# Patient Record
Sex: FEMALE | Race: WHITE | Employment: FULL TIME | URBAN - METROPOLITAN AREA
[De-identification: names, ages, dates, MRNs, and addresses within clinical notes are randomized per-mention and may not be internally consistent; named-entity substitution may affect disease eponyms.]

---

## 2017-01-10 ENCOUNTER — TELEPHONE (OUTPATIENT)
Dept: INTERNAL MEDICINE CLINIC | Facility: CLINIC | Age: 52
End: 2017-01-10

## 2017-01-10 RX ORDER — MELOXICAM 15 MG/1
15 TABLET ORAL DAILY
Qty: 7 TABLET | Refills: 0 | Status: SHIPPED | OUTPATIENT
Start: 2017-01-10 | End: 2017-09-06 | Stop reason: ALTCHOICE

## 2017-01-10 NOTE — TELEPHONE ENCOUNTER
Both of her legs are both swollen and is affecting her knees. They feel heavy and she is wearing compression socks. Thinks   It can be due to the weather. She tried the hydrocodone but thinks an anti-inflammatory would help more.       [id number] [place]

## 2017-01-10 NOTE — TELEPHONE ENCOUNTER
Relayed dr Sidhu Mins understanding, rx sent, stated she's only on an diuretic daily with her BP med

## 2017-01-10 NOTE — TELEPHONE ENCOUNTER
Pt called back stated she swells daily r/t DX Lymphedema (genetic) but she thinks with the change in the weather since Sunday Bilateral swelling is more,much heavier,painful,no redness,taking muscle relaxer's,vicodin,Knees are swollen/feels tight, thinks s

## 2017-09-06 ENCOUNTER — OFFICE VISIT (OUTPATIENT)
Dept: INTERNAL MEDICINE CLINIC | Facility: CLINIC | Age: 52
End: 2017-09-06

## 2017-09-06 VITALS
SYSTOLIC BLOOD PRESSURE: 130 MMHG | HEART RATE: 87 BPM | WEIGHT: 250.63 LBS | DIASTOLIC BLOOD PRESSURE: 84 MMHG | TEMPERATURE: 98 F | HEIGHT: 67 IN | RESPIRATION RATE: 20 BRPM | OXYGEN SATURATION: 98 % | BODY MASS INDEX: 39.34 KG/M2

## 2017-09-06 DIAGNOSIS — E78.5 HYPERLIPIDEMIA, UNSPECIFIED HYPERLIPIDEMIA TYPE: ICD-10-CM

## 2017-09-06 DIAGNOSIS — E11.9 TYPE 2 DIABETES MELLITUS WITHOUT COMPLICATION, WITHOUT LONG-TERM CURRENT USE OF INSULIN (HCC): ICD-10-CM

## 2017-09-06 DIAGNOSIS — I10 ESSENTIAL HYPERTENSION: ICD-10-CM

## 2017-09-06 DIAGNOSIS — E03.9 HYPOTHYROIDISM, UNSPECIFIED TYPE: ICD-10-CM

## 2017-09-06 DIAGNOSIS — Z12.11 SCREENING FOR MALIGNANT NEOPLASM OF COLON: ICD-10-CM

## 2017-09-06 DIAGNOSIS — Z12.31 VISIT FOR SCREENING MAMMOGRAM: ICD-10-CM

## 2017-09-06 DIAGNOSIS — Z00.00 ROUTINE PHYSICAL EXAMINATION: Primary | ICD-10-CM

## 2017-09-06 DIAGNOSIS — Z78.0 MENOPAUSE: ICD-10-CM

## 2017-09-06 PROCEDURE — 99396 PREV VISIT EST AGE 40-64: CPT | Performed by: INTERNAL MEDICINE

## 2017-09-06 RX ORDER — CHOLECALCIFEROL (VITAMIN D3) 125 MCG
500 CAPSULE ORAL DAILY
COMMUNITY

## 2017-09-06 RX ORDER — LOSARTAN POTASSIUM AND HYDROCHLOROTHIAZIDE 25; 100 MG/1; MG/1
TABLET ORAL
COMMUNITY
Start: 2017-08-10

## 2017-09-06 RX ORDER — HYDROCODONE BITARTRATE AND ACETAMINOPHEN 5; 325 MG/1; MG/1
TABLET ORAL
Qty: 30 TABLET | Refills: 0 | Status: SHIPPED | OUTPATIENT
Start: 2017-09-06 | End: 2018-07-31 | Stop reason: ALTCHOICE

## 2017-09-06 NOTE — ASSESSMENT & PLAN NOTE
Stable on levothyroxine at 125 mcg once daily. Last labs as documented per endocrinology at [facility] was normal and hence refills have been provided.

## 2017-09-06 NOTE — PATIENT INSTRUCTIONS
Problem List Items Addressed This Visit        Unprioritized    Essential hypertension     Blood pressure 130/84, pulse 87, temperature 97.6 °F (36.4 °C), temperature source Oral, resp.  rate 20, height 5' 7\" (1.702 m), weight 250 lb 9.6 oz (113.7 kg), las Medications    MetFORMIN HCl 1000 MG Oral Tab    Other Relevant Orders    CBC WITH DIFFERENTIAL WITH PLATELET    COMP METABOLIC PANEL (14)    HEMOGLOBIN A1C    LIPID PANEL    MICROALB/CREAT RATIO, RANDOM URINE    URINALYSIS, ROUTINE    ASSAY, THYROID STIM

## 2017-09-06 NOTE — ASSESSMENT & PLAN NOTE
Patient has been on diet control alone and unplanned sterols. She does take pravastatin once a week as any higher doses causes leg cramps. Recheck labs per endocrinology has been reviewed. Orders for repeat labs have been provided.

## 2017-09-06 NOTE — ASSESSMENT & PLAN NOTE
Blood pressure 130/84, pulse 87, temperature 97.6 °F (36.4 °C), temperature source Oral, resp. rate 20, height 5' 7\" (1.702 m), weight 250 lb 9.6 oz (113.7 kg), last menstrual period 08/11/2017, SpO2 98 %, not currently breastfeeding.      Stable blood pre

## 2017-09-06 NOTE — PROGRESS NOTES
HPI:    Patient ID:  Physical    Pap Smear,3 Years due on 09/10/2016  Pap per Dr Alex Prieto. Adriangustavo Rosalie due on 08/13/2017    There is no immunization history on file for this patient. Tdap is due but pt declines.   Refuses flu as well as the pneumovac attending    mR Mayorga, PGY1 [id number]          Review of Systems   Constitutional: Negative. HENT: Negative. Eyes: Negative. Respiratory: Negative. Cardiovascular: Negative. Gastrointestinal: Negative. Endocrine: Negative.     Micheline Seymour Body mass index is 39.25 kg/m². PHYSICAL EXAM:   Physical Exam   Constitutional: She is oriented to person, place, and time. She appears well-developed and well-nourished.    HENT:   Right Ear: External ear normal.   Left Ear: External ear normal. oz (113.7 kg), last menstrual period 08/11/2017, SpO2 98 %, not currently breastfeeding. Stable blood pressure, continue on Hyzaar 100/25, 1 tablet once daily. Renal functions have been normal per last labs done at [place].   Will follow-up on repeat la THYROID STIM HORMONE    VITAMIN B12    VITAMIN D, 25-HYDROXY      Other Visit Diagnoses     Menopause        Relevant Orders    XR DEXA BONE DENSITOMETRY (CPT=77080)    Visit for screening mammogram        Relevant Orders    [place] SCREENING BILAT (CPT=77067)

## 2017-09-06 NOTE — ASSESSMENT & PLAN NOTE
Currently on glimepiride at 2 mg once daily, metformin 1000 mg 2 times daily, Orvel Damion. Given current issues with [employer] have discussed need to discontinue medication and discuss alternate treatments with endocrinology at [facility].   Eye exam di

## 2017-09-06 NOTE — ASSESSMENT & PLAN NOTE
Normal exam.  Labs as ordered. Skin check normal.  No significant abnormal nevi. Skin check usually completed per Dr. Monica Rocha. Breast exam completed–no palpable abnormalities, discharge from the nipples or axillary adenopathy.   No cervical or inguinal lym

## 2017-10-27 ENCOUNTER — OFFICE VISIT (OUTPATIENT)
Dept: OBGYN CLINIC | Facility: CLINIC | Age: 52
End: 2017-10-27

## 2017-10-27 VITALS
SYSTOLIC BLOOD PRESSURE: 142 MMHG | BODY MASS INDEX: 37.87 KG/M2 | DIASTOLIC BLOOD PRESSURE: 84 MMHG | WEIGHT: 247 LBS | HEART RATE: 91 BPM | HEIGHT: 67.75 IN

## 2017-10-27 DIAGNOSIS — Z12.31 VISIT FOR SCREENING MAMMOGRAM: ICD-10-CM

## 2017-10-27 DIAGNOSIS — Z01.419 ENCOUNTER FOR GYNECOLOGICAL EXAMINATION WITHOUT ABNORMAL FINDING: Primary | ICD-10-CM

## 2017-10-27 PROCEDURE — 99396 PREV VISIT EST AGE 40-64: CPT | Performed by: OBSTETRICS & GYNECOLOGY

## 2017-10-30 NOTE — PROGRESS NOTES
Leigha Hernandez is a 47year old female [facility] Patient's last menstrual period was 08/14/2017. Patient presents with:  Gyn Exam: Annual and Mammo order. Doing well. Not on ocps. Periods skipping every 1-3 months -- normal in heaviness. No hot flashes. Dony Rahman Grandmother      CAD   • Breast Cancer Other      family h/o, all four pA   • Glaucoma Neg        MEDICATIONS:    Current Outpatient Prescriptions:   •  MetFORMIN HCl 1000 MG Oral Tab, TAKE ONE TABLET BY MOUTH TWO TIMES A DAY WITH MEALS, Disp: , Rfl:   • any breast pain, lumps, or discharge. Neurological:  denies headaches, extremity weakness or numbness. Psychiatric: denies depression or anxiety. Endocrine:   denies excessive thirst or urination.   Heme/Lymph:  denies history of anemia, easy bruising o

## 2018-04-25 ENCOUNTER — OFFICE VISIT (OUTPATIENT)
Dept: INTERNAL MEDICINE CLINIC | Facility: CLINIC | Age: 53
End: 2018-04-25

## 2018-04-25 ENCOUNTER — LAB ENCOUNTER (OUTPATIENT)
Dept: LAB | Facility: HOSPITAL | Age: 53
End: 2018-04-25
Attending: INTERNAL MEDICINE
Payer: COMMERCIAL

## 2018-04-25 ENCOUNTER — HOSPITAL ENCOUNTER (OUTPATIENT)
Dept: CT IMAGING | Facility: HOSPITAL | Age: 53
Discharge: HOME | End: 2018-04-25
Attending: INTERNAL MEDICINE
Payer: COMMERCIAL

## 2018-04-25 ENCOUNTER — NURSE TRIAGE (OUTPATIENT)
Dept: INTERNAL MEDICINE CLINIC | Facility: CLINIC | Age: 53
End: 2018-04-25

## 2018-04-25 VITALS
DIASTOLIC BLOOD PRESSURE: 84 MMHG | RESPIRATION RATE: 18 BRPM | BODY MASS INDEX: 37.83 KG/M2 | TEMPERATURE: 98 F | HEART RATE: 83 BPM | SYSTOLIC BLOOD PRESSURE: 128 MMHG | WEIGHT: 241 LBS | HEIGHT: 67 IN

## 2018-04-25 DIAGNOSIS — R10.33 PERIUMBILICAL ABDOMINAL PAIN: ICD-10-CM

## 2018-04-25 DIAGNOSIS — R10.33 PERIUMBILICAL ABDOMINAL PAIN: Primary | ICD-10-CM

## 2018-04-25 PROCEDURE — 82150 ASSAY OF AMYLASE: CPT

## 2018-04-25 PROCEDURE — 99214 OFFICE O/P EST MOD 30 MIN: CPT | Performed by: INTERNAL MEDICINE

## 2018-04-25 PROCEDURE — 85025 COMPLETE CBC W/AUTO DIFF WBC: CPT

## 2018-04-25 PROCEDURE — 74177 CT ABD & PELVIS W/CONTRAST: CPT | Performed by: INTERNAL MEDICINE

## 2018-04-25 PROCEDURE — 99212 OFFICE O/P EST SF 10 MIN: CPT | Performed by: INTERNAL MEDICINE

## 2018-04-25 PROCEDURE — 82565 ASSAY OF CREATININE: CPT

## 2018-04-25 PROCEDURE — 80053 COMPREHEN METABOLIC PANEL: CPT

## 2018-04-25 PROCEDURE — 83690 ASSAY OF LIPASE: CPT

## 2018-04-25 PROCEDURE — 36415 COLL VENOUS BLD VENIPUNCTURE: CPT

## 2018-04-25 RX ORDER — DULAGLUTIDE 0.75 MG/.5ML
INJECTION, SOLUTION SUBCUTANEOUS
Refills: 10 | COMMUNITY
Start: 2018-04-04

## 2018-04-25 RX ORDER — CIPROFLOXACIN 500 MG/1
500 TABLET, FILM COATED ORAL 2 TIMES DAILY
Qty: 14 TABLET | Refills: 0 | Status: SHIPPED | OUTPATIENT
Start: 2018-04-25 | End: 2018-05-02

## 2018-04-25 NOTE — TELEPHONE ENCOUNTER
Action Requested: Summary for Provider     []  Critical Lab, Recommendations Needed  [x] Need Additional Advice  []   FYI    []   Need Orders  [] Need Medications Sent to Pharmacy  []  Other     SUMMARY: Pt called states that she has been having intermitte

## 2018-04-25 NOTE — TELEPHONE ENCOUNTER
Reason for Disposition  • MILD pain that comes and goes (cramps) lasts > 24 hours    Protocols used: ABDOMINAL PAIN - UPPER-A-OH

## 2018-04-25 NOTE — ASSESSMENT & PLAN NOTE
Intermittent episodes of abdominal bloating, pain usually in the epigastric and periumbilical region. Crampy severe pain. Recently with chills, nausea. Has been having intermittent episodes of diarrhea with constipation as a baseline.   Hyperactive bowel

## 2018-04-25 NOTE — PROGRESS NOTES
HPI:    Patient ID: Leigha Hernandez is a 47year old female. Abdominal Pain   This is a recurrent problem. The current episode started more than 1 month ago. The problem occurs intermittently.  Progression since onset: Episodic exacerbations of abdominal 2000 UNITS Oral Cap Take 2 capsules by mouth daily.  Take 5,000 units daily  Disp:  Rfl:    Levothyroxine Sodium (SYNTHROID, LEVOTHROID) 125 MCG Oral Tab Take 125 mcg by mouth before breakfast. Take 1/2 tablet by mouth daily  Disp:  Rfl:    glimepiride (AMA area. There is no rebound and no CVA tenderness. No hernia. History of umbilical hernia repair in childhood. Lymphadenopathy:     She has no cervical adenopathy. Neurological: She is alert and oriented to person, place, and time.  She has normal refle

## 2018-04-26 RX ORDER — CIPROFLOXACIN 500 MG/1
500 TABLET, FILM COATED ORAL 2 TIMES DAILY
Qty: 14 TABLET | Refills: 0 | Status: SHIPPED | OUTPATIENT
Start: 2018-04-26 | End: 2018-07-31 | Stop reason: ALTCHOICE

## 2018-10-26 ENCOUNTER — HOSPITAL ENCOUNTER (OUTPATIENT)
Dept: MAMMOGRAPHY | Facility: HOSPITAL | Age: 53
Discharge: HOME | End: 2018-10-26
Attending: OBSTETRICS & GYNECOLOGY
Payer: COMMERCIAL

## 2018-10-26 DIAGNOSIS — Z12.31 VISIT FOR SCREENING MAMMOGRAM: ICD-10-CM

## 2018-10-26 PROCEDURE — 77063 BREAST TOMOSYNTHESIS BI: CPT | Performed by: OBSTETRICS & GYNECOLOGY

## 2018-10-26 PROCEDURE — 77067 SCR MAMMO BI INCL CAD: CPT | Performed by: OBSTETRICS & GYNECOLOGY

## 2018-11-05 NOTE — PROGRESS NOTES
Normal mammogram.  Next in one year. Encouraged to do monthly self breast exams.  Letter Sent via [employer]

## 2018-11-15 ENCOUNTER — APPOINTMENT (OUTPATIENT)
Dept: LAB | Facility: HOSPITAL | Age: 53
End: 2018-11-15
Attending: OBSTETRICS & GYNECOLOGY
Payer: COMMERCIAL

## 2018-11-15 ENCOUNTER — OFFICE VISIT (OUTPATIENT)
Dept: OBGYN CLINIC | Facility: CLINIC | Age: 53
End: 2018-11-15
Payer: COMMERCIAL

## 2018-11-15 VITALS — WEIGHT: 252 LBS | HEIGHT: 67 IN | BODY MASS INDEX: 39.55 KG/M2

## 2018-11-15 DIAGNOSIS — R68.82 DECREASED LIBIDO: ICD-10-CM

## 2018-11-15 DIAGNOSIS — N94.10 DYSPAREUNIA, FEMALE: ICD-10-CM

## 2018-11-15 DIAGNOSIS — Z12.4 SCREENING FOR MALIGNANT NEOPLASM OF CERVIX: ICD-10-CM

## 2018-11-15 DIAGNOSIS — Z01.411 ENCOUNTER FOR GYNECOLOGICAL EXAMINATION WITH ABNORMAL FINDING: Primary | ICD-10-CM

## 2018-11-15 DIAGNOSIS — N91.2 NO PERIODS: ICD-10-CM

## 2018-11-15 PROCEDURE — 99396 PREV VISIT EST AGE 40-64: CPT | Performed by: OBSTETRICS & GYNECOLOGY

## 2018-11-15 PROCEDURE — 36415 COLL VENOUS BLD VENIPUNCTURE: CPT

## 2018-11-15 PROCEDURE — 99212 OFFICE O/P EST SF 10 MIN: CPT | Performed by: OBSTETRICS & GYNECOLOGY

## 2018-11-15 PROCEDURE — 84703 CHORIONIC GONADOTROPIN ASSAY: CPT

## 2018-11-15 RX ORDER — GLIMEPIRIDE 4 MG/1
4 TABLET ORAL
COMMUNITY
Start: 2018-04-04

## 2018-11-15 RX ORDER — LEVOTHYROXINE SODIUM 0.12 MG/1
125 TABLET ORAL
COMMUNITY
Start: 2018-04-04 | End: 2019-10-18

## 2018-11-15 RX ORDER — CHOLECALCIFEROL (VITAMIN D3) 50 MCG
2 TABLET ORAL
COMMUNITY
End: 2019-10-18

## 2018-11-15 RX ORDER — MEDROXYPROGESTERONE ACETATE 10 MG/1
10 TABLET ORAL DAILY
Qty: 5 TABLET | Refills: 0 | Status: SHIPPED | OUTPATIENT
Start: 2018-11-15 | End: 2019-10-18

## 2018-11-15 RX ORDER — ASCORBIC ACID
500 CRYSTALS ORAL
COMMUNITY

## 2018-11-15 RX ORDER — LEVOTHYROXINE SODIUM 100 %
POWDER (GRAM) MISCELLANEOUS
COMMUNITY
End: 2019-10-18

## 2018-11-16 ENCOUNTER — TELEPHONE (OUTPATIENT)
Dept: OBGYN CLINIC | Facility: CLINIC | Age: 53
End: 2018-11-16

## 2018-11-16 NOTE — TELEPHONE ENCOUNTER
Pt is calling for results of pregnancy test.  Pt was advised the test was Negative. Pt verbalized understanding.

## 2018-11-20 NOTE — PROGRESS NOTES
Noelle Christianson is a 47year old female [facility] Patient's last menstrual period was 04/15/2018. Patient presents with:  Gyn Exam: annual  Other: decreased libido; increased pain. Had no issues then abruptly started.  7 months ago. No hot flashes. Substance and Sexual Activity      Alcohol use:  Yes        Alcohol/week: 0.0 oz        Comment: 1 drink/month      Drug use: No      Sexual activity: Yes        Comment:     Other Topics      Concerns:         Service: Not Asked        Blood DAY WITH MEALS, Disp: , Rfl:   •  Losartan Potassium-HCTZ 100-25 MG Oral Tab, TAKE ONE TABLET BY MOUTH EVERY DAY, Disp: , Rfl:   •  COENZYME Q10 OR, Take by mouth., Disp: , Rfl:   •  Vitamin B-12 500 MCG Oral Tab, Take 500 mcg by mouth daily. , Disp: , Rfl: adenopathy  Lymphatic: no abnormal supraclavicular or axillary adenopathy is noted  Breast:   normal without palpable masses, tenderness, asymmetry, nipple discharge, nipple retraction or skin changes  Abdomen:   soft, nontender, nondistended, no masses  S year.  Check u/s to r/o anatomical causes of deep pain. Needs to use K-Y lubricant more freely. Consider vaginal estrogen or PT -- declines. For now check hcg & if neg, provera 10 mg daily x 5d. Reviewed pathphys of decreased libido in perimenopause.

## 2018-11-28 ENCOUNTER — HOSPITAL ENCOUNTER (OUTPATIENT)
Dept: ULTRASOUND IMAGING | Facility: HOSPITAL | Age: 53
Discharge: HOME | End: 2018-11-28
Attending: OBSTETRICS & GYNECOLOGY
Payer: COMMERCIAL

## 2018-11-28 DIAGNOSIS — N94.10 DYSPAREUNIA, FEMALE: ICD-10-CM

## 2018-11-28 PROCEDURE — 76830 TRANSVAGINAL US NON-OB: CPT | Performed by: OBSTETRICS & GYNECOLOGY

## 2018-11-28 PROCEDURE — 76856 US EXAM PELVIC COMPLETE: CPT | Performed by: OBSTETRICS & GYNECOLOGY

## 2018-12-12 ENCOUNTER — TELEPHONE (OUTPATIENT)
Dept: OBGYN CLINIC | Facility: CLINIC | Age: 53
End: 2018-12-12

## 2018-12-12 NOTE — TELEPHONE ENCOUNTER
----- Message from Sowmya Fuller MD sent at 12/1/2018  6:47 PM CST -----  Pt perimenopausal thus lining normal. Neg u/s

## 2019-07-15 ENCOUNTER — TELEPHONE (OUTPATIENT)
Dept: OTHER | Age: 54
End: 2019-07-15

## 2019-07-15 NOTE — TELEPHONE ENCOUNTER
Pt having ongoing symptoms of yeast infection. Itching and white discharge. OTC monistat x 3 days not helping. Requesting rx for diflucan with refills. MARY please advise.

## 2019-07-16 RX ORDER — FLUCONAZOLE 150 MG/1
150 TABLET ORAL ONCE
Qty: 2 TABLET | Refills: 0 | Status: SHIPPED | OUTPATIENT
Start: 2019-07-16 | End: 2019-07-16

## 2019-07-16 NOTE — TELEPHONE ENCOUNTER
Diflucan 150 mgs 1 tab po q weekly x 2 doses-2 tabs  Has not been seen since 4/2018-needs a visit in the next 4-5 weeks

## 2019-10-08 ENCOUNTER — TELEPHONE (OUTPATIENT)
Dept: INTERNAL MEDICINE CLINIC | Facility: CLINIC | Age: 54
End: 2019-10-08

## 2019-10-08 ENCOUNTER — NURSE TRIAGE (OUTPATIENT)
Dept: INTERNAL MEDICINE CLINIC | Facility: CLINIC | Age: 54
End: 2019-10-08

## 2019-10-08 NOTE — TELEPHONE ENCOUNTER
Advised patient of Dr. Natalia Negro note. Patient verbalized understanding  Patient stated she does not feel like she needs to go to the ER and is requesting to be scheduled on 10/18/19 at 2:30 p.m. TCM slot.      Patient stated, \" I still want the appointment

## 2019-10-08 NOTE — TELEPHONE ENCOUNTER
Called Pt regarding appointment 10/18 with Dr. Sharyle Mealing. Pt understands if feeling worse before this appointment to go to ER; verbalizes understanding.

## 2019-10-08 NOTE — TELEPHONE ENCOUNTER
Not appropriate to wait 10 days -would adv er eval and a possible ctas no hx of vertigo in the past -especiallywith neck soreness

## 2019-10-08 NOTE — TELEPHONE ENCOUNTER
Action Requested: Summary for Provider     []  Critical Lab, Recommendations Needed  [] Need Additional Advice  []   FYI    []   Need Orders  [] Need Medications Sent to Pharmacy  []  Other     SUMMARY: Dr Diego Melissa, patient only wants to see you, when to add

## 2019-10-08 NOTE — TELEPHONE ENCOUNTER
Patient called in stating that about 4 years ago she had neck surgery and recently she has been experiencing pain with intermittent dizziness. She is requesting an appointment with Dr. Sunny Romero. CSS transferred to triage/decision tree denies appointment.

## 2019-10-18 ENCOUNTER — APPOINTMENT (OUTPATIENT)
Dept: CT IMAGING | Facility: HOSPITAL | Age: 54
End: 2019-10-18
Attending: EMERGENCY MEDICINE
Payer: COMMERCIAL

## 2019-10-18 ENCOUNTER — APPOINTMENT (OUTPATIENT)
Dept: MRI IMAGING | Facility: HOSPITAL | Age: 54
End: 2019-10-18
Attending: EMERGENCY MEDICINE
Payer: COMMERCIAL

## 2019-10-18 ENCOUNTER — OFFICE VISIT (OUTPATIENT)
Dept: INTERNAL MEDICINE CLINIC | Facility: CLINIC | Age: 54
End: 2019-10-18
Payer: COMMERCIAL

## 2019-10-18 ENCOUNTER — HOSPITAL ENCOUNTER (EMERGENCY)
Facility: HOSPITAL | Age: 54
Discharge: HOME | End: 2019-10-18
Attending: EMERGENCY MEDICINE
Payer: COMMERCIAL

## 2019-10-18 VITALS
RESPIRATION RATE: 16 BRPM | SYSTOLIC BLOOD PRESSURE: 124 MMHG | DIASTOLIC BLOOD PRESSURE: 84 MMHG | WEIGHT: 255 LBS | HEART RATE: 96 BPM | HEIGHT: 67 IN | BODY MASS INDEX: 40.02 KG/M2

## 2019-10-18 VITALS
OXYGEN SATURATION: 98 % | RESPIRATION RATE: 16 BRPM | HEART RATE: 86 BPM | SYSTOLIC BLOOD PRESSURE: 141 MMHG | TEMPERATURE: 98 F | DIASTOLIC BLOOD PRESSURE: 73 MMHG

## 2019-10-18 DIAGNOSIS — M54.2 NECK PAIN: Primary | ICD-10-CM

## 2019-10-18 PROCEDURE — 70551 MRI BRAIN STEM W/O DYE: CPT | Performed by: EMERGENCY MEDICINE

## 2019-10-18 PROCEDURE — 83735 ASSAY OF MAGNESIUM: CPT | Performed by: EMERGENCY MEDICINE

## 2019-10-18 PROCEDURE — 80048 BASIC METABOLIC PNL TOTAL CA: CPT | Performed by: EMERGENCY MEDICINE

## 2019-10-18 PROCEDURE — 99213 OFFICE O/P EST LOW 20 MIN: CPT | Performed by: INTERNAL MEDICINE

## 2019-10-18 PROCEDURE — 99284 EMERGENCY DEPT VISIT MOD MDM: CPT

## 2019-10-18 PROCEDURE — 70498 CT ANGIOGRAPHY NECK: CPT | Performed by: EMERGENCY MEDICINE

## 2019-10-18 PROCEDURE — 85025 COMPLETE CBC W/AUTO DIFF WBC: CPT | Performed by: EMERGENCY MEDICINE

## 2019-10-18 PROCEDURE — 96374 THER/PROPH/DIAG INJ IV PUSH: CPT

## 2019-10-18 RX ORDER — MECLIZINE HYDROCHLORIDE 25 MG/1
25 TABLET ORAL 3 TIMES DAILY PRN
Qty: 20 TABLET | Refills: 0 | Status: SHIPPED | OUTPATIENT
Start: 2019-10-18 | End: 2021-08-18

## 2019-10-18 RX ORDER — CANAGLIFLOZIN 300 MG/1
TABLET, FILM COATED ORAL
Refills: 2 | COMMUNITY
Start: 2019-09-29 | End: 2021-08-18

## 2019-10-18 RX ORDER — NAPROXEN 500 MG/1
500 TABLET ORAL 2 TIMES DAILY PRN
Qty: 20 TABLET | Refills: 0 | Status: SHIPPED | OUTPATIENT
Start: 2019-10-18 | End: 2019-10-25

## 2019-10-18 RX ORDER — LORAZEPAM 1 MG/1
1 TABLET ORAL ONCE
Status: DISCONTINUED | OUTPATIENT
Start: 2019-10-18 | End: 2019-10-18

## 2019-10-18 RX ORDER — GABAPENTIN 100 MG/1
100 CAPSULE ORAL 2 TIMES DAILY
Qty: 28 CAPSULE | Refills: 0 | Status: SHIPPED | OUTPATIENT
Start: 2019-10-18 | End: 2019-11-01

## 2019-10-18 RX ORDER — MECLIZINE HCL 12.5 MG/1
TABLET ORAL
Qty: 30 TABLET | Refills: 2 | Status: SHIPPED | OUTPATIENT
Start: 2019-10-18 | End: 2021-08-18

## 2019-10-18 RX ORDER — LORAZEPAM 2 MG/ML
1 INJECTION INTRAMUSCULAR ONCE
Status: COMPLETED | OUTPATIENT
Start: 2019-10-18 | End: 2019-10-18

## 2019-10-18 NOTE — PATIENT INSTRUCTIONS
Problem List Items Addressed This Visit        Unprioritized    Neck pain - Primary     Sudden onset of severe pain at the nape of the neck with sensation of heat followed by lightheadedness, neck discomfort and pain radiating down into the arms.   She has

## 2019-10-18 NOTE — ASSESSMENT & PLAN NOTE
Sudden onset of severe pain at the nape of the neck with sensation of heat followed by lightheadedness, neck discomfort and pain radiating down into the arms. She has had a sensation of lightheadedness, dizziness and gait instability.   She has been trying

## 2019-10-18 NOTE — ED NOTES
Patient refusing blood work at this time. States \"Dr. My Correa spoke to Dr. Aneesh Jurado and I'm here for an MRI. \"

## 2019-10-18 NOTE — ED INITIAL ASSESSMENT (HPI)
Patient sent here for her primary doctors office for a month of dizziness and neck pain.  Per patient, doctor spoke with Dr Jackie Mckeon to come here for an MRI

## 2019-10-18 NOTE — PROGRESS NOTES
HPI:    Patient ID: Do Schulz is a 49year old female. Sudden severe pain at the base of the neck which happened about 4 to 5 days back followed by a sensation of heat around the neck and pain radiating into the arms.   She has had intermittent lig Subcutaneous Solution Pen-injector, , Disp: , Rfl: 10  MetFORMIN HCl 1000 MG Oral Tab, TAKE ONE TABLET BY MOUTH TWO TIMES A DAY WITH MEALS, Disp: , Rfl:   Losartan Potassium-HCTZ 100-25 MG Oral Tab, TAKE ONE TABLET BY MOUTH EVERY DAY, Disp: , Rfl:   Rosa Maxwell Normal range of motion. General: No tenderness or edema. Lymphadenopathy:     She has no cervical adenopathy. Neurological: She is alert and oriented to person, place, and time. She has normal reflexes. She displays normal reflexes.  No crania

## 2019-10-19 NOTE — ED PROVIDER NOTES
Patient Seen in: [facility] AND [facility] Emergency Department      History   Patient presents with:  Dizziness (neurologic)    Stated Complaint: Dizziness    HPI    56-year-old female presents for evaluation of neck pain and dizziness.   Patient reports for the is not in acute distress. Appearance: She is well-developed. HENT:      Head: Normocephalic and atraumatic. Eyes:      Conjunctiva/sclera: Conjunctivae normal.   Neck:      Musculoskeletal: Normal range of motion and neck supple. No neck rigidity. ------                     CBC W/ DIFFERENTIAL[[id number]]          Abnormal            Final result                 Please view results for these tests on the individual orders.    RAINBOW DRAW BLUE   RAINBOW DRAW LAVENDER   RAINBOW DRAW LIGHT GREEN non-ionic     intravenous contrast material. Multi-planar reformatted/3-D images were     created to optimize visualization of vascular anatomy. Automated exposure     control for dose reduction was used.      Adjustment of the mA and/or kV was done based (CST): Airam Larson MD on 10/18/2019 at 20:22                 ED Medications Administered:   Medications   LORazepam (ATIVAN) injection 1 mg (1 mg Intravenous Given 10/18/19 1949)   iopamidol (ISOVUE-M) 76 % injection 100 mL (70 mL Intravenous G associated acute management issues with the Patient and  at the bedside, and I explained the need for further follow-up evaluation and treatment.       Condition upon disposition: Stable          Disposition and Plan     Clinical Impression:  Neck pa

## 2020-08-24 ENCOUNTER — PATIENT OUTREACH (OUTPATIENT)
Dept: INTERNAL MEDICINE CLINIC | Facility: CLINIC | Age: 55
End: 2020-08-24

## 2020-08-24 NOTE — TELEPHONE ENCOUNTER
Patient is seen by endocrinology at [facility]. She did have some insurance restrictions. Please check if she plans to follow-up with us. I will place the order if so.   She will also need to set up an appointment

## 2020-08-24 NOTE — PROGRESS NOTES
Left message for patient to call office back, patient due for [employer] [place] last current order exp. 9/6/18. Dr foster Flores pending in EMR.

## 2020-10-02 ENCOUNTER — HOSPITAL ENCOUNTER (OUTPATIENT)
Dept: MAMMOGRAPHY | Age: 55
Discharge: HOME | End: 2020-10-02
Attending: INTERNAL MEDICINE
Payer: COMMERCIAL

## 2020-10-02 DIAGNOSIS — Z12.31 ENCOUNTER FOR SCREENING MAMMOGRAM FOR BREAST CANCER: ICD-10-CM

## 2020-10-02 PROCEDURE — 77063 BREAST TOMOSYNTHESIS BI: CPT | Performed by: INTERNAL MEDICINE

## 2020-10-02 PROCEDURE — 77067 SCR MAMMO BI INCL CAD: CPT | Performed by: INTERNAL MEDICINE

## 2020-11-09 ENCOUNTER — PATIENT MESSAGE (OUTPATIENT)
Dept: INTERNAL MEDICINE CLINIC | Facility: CLINIC | Age: 55
End: 2020-11-09

## 2020-11-09 ENCOUNTER — NURSE TRIAGE (OUTPATIENT)
Dept: INTERNAL MEDICINE CLINIC | Facility: CLINIC | Age: 55
End: 2020-11-09

## 2020-11-09 NOTE — TELEPHONE ENCOUNTER
Action Requested: Summary for Provider     []  Critical Lab, Recommendations Needed  [] Need Additional Advice  []   FYI    []   Need Orders  [] Need Medications Sent to Pharmacy  []  Other     SUMMARY:    Patient thinks she got Covid from a new co-worker.

## 2020-11-09 NOTE — TELEPHONE ENCOUNTER
See acute telephone encounter 11/9/20. From: Diego Pizano  To:  Milly Gibson MD  Sent: 11/9/2020  6:25 AM CST  Subject: Other    Good morning Dr,    I need a script for a Covid test.  I am running a fecer of 102, chills, bodies aches, headaches and

## 2020-11-09 NOTE — TELEPHONE ENCOUNTER
Keshav message sent. Triage RN=call and triage. ----- Message from Becky Santos.  Silva Gavin sent at 11/9/2020  6:25 AM CST -----  Regarding: Other  Contact: [phone]  Good morning ,    I need a script for a Covid test.  I am running a fecer of 102, chil

## 2020-11-10 ENCOUNTER — TELEMEDICINE (OUTPATIENT)
Dept: INTERNAL MEDICINE CLINIC | Facility: CLINIC | Age: 55
End: 2020-11-10
Payer: COMMERCIAL

## 2020-11-10 DIAGNOSIS — B34.9 ACUTE VIRAL SYNDROME: Primary | ICD-10-CM

## 2020-11-10 DIAGNOSIS — J01.00 ACUTE NON-RECURRENT MAXILLARY SINUSITIS: ICD-10-CM

## 2020-11-10 PROBLEM — J32.0 MAXILLARY SINUSITIS: Status: ACTIVE | Noted: 2020-11-10

## 2020-11-10 PROCEDURE — 99214 OFFICE O/P EST MOD 30 MIN: CPT | Performed by: NURSE PRACTITIONER

## 2020-11-10 RX ORDER — AZITHROMYCIN 250 MG/1
TABLET, FILM COATED ORAL
Qty: 6 TABLET | Refills: 0 | Status: SHIPPED | OUTPATIENT
Start: 2020-11-10 | End: 2020-11-15

## 2020-11-10 NOTE — ASSESSMENT & PLAN NOTE
A/P 67-year-old female who is diabetic and last week developed postnasal drip. She also had a metallic taste in her mouth. She had heightened sense of smell and taste. She denies any body aches. She did have a fever that went up as high as 101.9.   She

## 2020-11-10 NOTE — PROGRESS NOTES
HPI:    Patient ID: Rosa Lim is a 48year old female. Please note that the following visit was completed using two-way, real-time interactive audio and/or video communication.   This has been done in good joy to provide continuity of care in the b stress of starting a new job she felt she was run down. She does meet some criteria for Covid testing such as fever, cough, chills, and feeling weak. Mo will test for Covid also. St likely maxillary sinus infection.   I visually examined the back of her Positive for cough. Negative for shortness of breath. Cardiovascular: Negative for chest pain, palpitations and leg swelling. Gastrointestinal: Negative for vomiting, abdominal pain, diarrhea and constipation.    Endocrine: Negative for cold intoleranc Allergy Text Annotation: amoxicillin  Penicillins                 Comment:Other reaction(s): PENICILLINS   PHYSICAL EXAM:   Physical Exam    Constitutional: She is oriented to person, place, and time. She appears well-developed and well-nourished.    Pulmon hours. Do not exceed 4 grams in a 24 hour period.              Relevant Medications    azithromycin (ZITHROMAX Z-NIK) 250 MG Oral Tab      Other Visit Diagnoses     Acute viral syndrome    -  Primary    Relevant Orders    SARS-COV-2 BY PCR ()

## 2020-11-11 ENCOUNTER — APPOINTMENT (OUTPATIENT)
Dept: LAB | Age: 55
End: 2020-11-11
Attending: NURSE PRACTITIONER
Payer: COMMERCIAL

## 2020-11-11 DIAGNOSIS — B34.9 ACUTE VIRAL SYNDROME: ICD-10-CM

## 2020-11-18 ENCOUNTER — TELEPHONE (OUTPATIENT)
Dept: INTERNAL MEDICINE CLINIC | Facility: CLINIC | Age: 55
End: 2020-11-18

## 2020-11-18 ENCOUNTER — TELEMEDICINE (OUTPATIENT)
Dept: INTERNAL MEDICINE CLINIC | Facility: CLINIC | Age: 55
End: 2020-11-18
Payer: COMMERCIAL

## 2020-11-18 DIAGNOSIS — U07.1 COVID-19: Primary | ICD-10-CM

## 2020-11-18 PROCEDURE — 99213 OFFICE O/P EST LOW 20 MIN: CPT | Performed by: NURSE PRACTITIONER

## 2020-11-18 NOTE — TELEPHONE ENCOUNTER
Virtual appointment made for today 11/18/20      Denies fever, chills, sweats, cough, pain, soreness, loss of taste/smell, nausea, vomiting, diarrhea symptoms, change in appetite, sore throat, headache.     Last used a fever reducing medication since Mon

## 2020-11-18 NOTE — ASSESSMENT & PLAN NOTE
A/P-48year-old female who recently started a new job and developed flulike symptoms several days after starting an in office. A coworker who went to a [employer] party spread Covid throughout the office. Patient had fever, diarrhea and nausea.   She said

## 2020-11-18 NOTE — PROGRESS NOTES
HPI:    Patient ID: Samir Patel is a 48year old female. Please note that the following visit was completed using two-way, real-time interactive audio and video communication.   This has been done in good joy to provide continuity of care in the best and social distance. 3) Letter sent to my chart stating she can return to work on November 30 if Covid test negative.         Past Medical History:   Diagnosis Date   • Diabetes ([address] 2002   • High cholesterol    • Hypertension 2005   • Hypothyroidism 2005 Psychiatric/Behavioral: The patient is not nervous/anxious.              Current Outpatient Medications   Medication Sig Dispense Refill   • INVOKANA 300 MG Oral Tab   2   • Meclizine HCl 25 MG Oral Tab Take 1 tablet (25 mg total) by mouth 3 (three) times BMI.(2)           ASSESSMENT/PLAN:     Problem List Items Addressed This Visit        Unprioritized    COVID-19 - Primary     A/P-48year-old female who recently started a new job and developed flulike symptoms several days after starting an in office.   A

## 2020-11-23 ENCOUNTER — APPOINTMENT (OUTPATIENT)
Dept: LAB | Age: 55
End: 2020-11-23
Attending: NURSE PRACTITIONER
Payer: COMMERCIAL

## 2020-11-23 DIAGNOSIS — U07.1 COVID-19: ICD-10-CM

## 2021-02-22 ENCOUNTER — PATIENT MESSAGE (OUTPATIENT)
Dept: INTERNAL MEDICINE CLINIC | Facility: CLINIC | Age: 56
End: 2021-02-22

## 2021-02-22 NOTE — TELEPHONE ENCOUNTER
From: Alphonso Alas  To: Adolfo Zambrano MD  Sent: 2/22/2021 10:18 AM CST  Subject: Other    I tested positive for covid on November 9th and negative on December 10th. I'm wanting to get the vaccine and will be eligible this Thursday.  However, should I get

## 2021-08-02 ENCOUNTER — OFFICE VISIT (OUTPATIENT)
Dept: OPTOMETRY | Facility: CLINIC | Age: 56
End: 2021-08-02
Payer: COMMERCIAL

## 2021-08-02 DIAGNOSIS — H52.4 PRESBYOPIA: ICD-10-CM

## 2021-08-02 DIAGNOSIS — E11.9 TYPE 2 DIABETES MELLITUS WITHOUT COMPLICATION, WITHOUT LONG-TERM CURRENT USE OF INSULIN (HCC): Primary | ICD-10-CM

## 2021-08-02 PROCEDURE — 92004 COMPRE OPH EXAM NEW PT 1/>: CPT | Performed by: OPTOMETRIST

## 2021-08-02 NOTE — PROGRESS NOTES
Mora Orr is a 55year old female.     HPI:     HPI     Diabetic Eye Exam     Diabetes Type: Type 2, controlled with diet and taking oral medications    Duration: 17 years    Number of years diabetic: 17    Number of years on pills: 17    Number of ye B 12) 250 MCG Oral Lozenge Take 500 mcg by mouth. • Dulaglutide 1.5 MG/0.5ML Subcutaneous Solution Pen-injector Inject 1.5 mg weekly     • glimepiride 4 MG Oral Tab Take 4 mg by mouth.      • TRULICITY 6.46 MN/2.1WW Subcutaneous Solution Pen-injector Full          Extraocular Movement       Right Left     Full, Ortho Full, Ortho          Neuro/Psych     Oriented x3: Yes    Mood/Affect: Normal          Dilation     Both eyes: 1.0% Mydriacyl and 2.5% Mk Synephrine @ 4:05 PM            Additional Tests

## 2021-08-02 NOTE — PATIENT INSTRUCTIONS
Type II diabetes mellitus ([facility]  I advised patient that there is no background diabetic retinopathy in either eye and that they should continue to keep their blood sugar under control and continue to see their physician as directed.  I stressed the importan

## 2021-08-18 ENCOUNTER — OFFICE VISIT (OUTPATIENT)
Dept: INTERNAL MEDICINE CLINIC | Facility: CLINIC | Age: 56
End: 2021-08-18
Payer: COMMERCIAL

## 2021-08-18 VITALS
HEART RATE: 86 BPM | DIASTOLIC BLOOD PRESSURE: 84 MMHG | TEMPERATURE: 97 F | SYSTOLIC BLOOD PRESSURE: 126 MMHG | WEIGHT: 260 LBS | BODY MASS INDEX: 40.81 KG/M2 | RESPIRATION RATE: 18 BRPM | HEIGHT: 67 IN

## 2021-08-18 DIAGNOSIS — Z00.00 ROUTINE PHYSICAL EXAMINATION: Primary | ICD-10-CM

## 2021-08-18 DIAGNOSIS — R60.9 LIPEDEMA: ICD-10-CM

## 2021-08-18 DIAGNOSIS — Z12.31 VISIT FOR SCREENING MAMMOGRAM: ICD-10-CM

## 2021-08-18 DIAGNOSIS — Z12.11 SCREENING FOR MALIGNANT NEOPLASM OF COLON: ICD-10-CM

## 2021-08-18 DIAGNOSIS — Z86.16 HISTORY OF 2019 NOVEL CORONAVIRUS DISEASE (COVID-19): ICD-10-CM

## 2021-08-18 DIAGNOSIS — Z78.0 MENOPAUSE: ICD-10-CM

## 2021-08-18 DIAGNOSIS — D51.9 ANEMIA DUE TO VITAMIN B12 DEFICIENCY, UNSPECIFIED B12 DEFICIENCY TYPE: ICD-10-CM

## 2021-08-18 DIAGNOSIS — E11.9 TYPE 2 DIABETES MELLITUS WITHOUT COMPLICATION, WITHOUT LONG-TERM CURRENT USE OF INSULIN (HCC): ICD-10-CM

## 2021-08-18 DIAGNOSIS — E55.9 VITAMIN D DEFICIENCY: ICD-10-CM

## 2021-08-18 DIAGNOSIS — E66.01 MORBID OBESITY (HCC): ICD-10-CM

## 2021-08-18 DIAGNOSIS — E78.5 HYPERLIPIDEMIA, UNSPECIFIED HYPERLIPIDEMIA TYPE: ICD-10-CM

## 2021-08-18 DIAGNOSIS — E03.9 HYPOTHYROIDISM, UNSPECIFIED TYPE: ICD-10-CM

## 2021-08-18 PROCEDURE — 99396 PREV VISIT EST AGE 40-64: CPT | Performed by: INTERNAL MEDICINE

## 2021-08-18 PROCEDURE — 3074F SYST BP LT 130 MM HG: CPT | Performed by: INTERNAL MEDICINE

## 2021-08-18 PROCEDURE — 3008F BODY MASS INDEX DOCD: CPT | Performed by: INTERNAL MEDICINE

## 2021-08-18 PROCEDURE — 3079F DIAST BP 80-89 MM HG: CPT | Performed by: INTERNAL MEDICINE

## 2021-08-18 NOTE — PROGRESS NOTES
HPI:   Paul Trujillo is a 55year old female who presents for an Annual Health Visit.          Pap Smear,3 Years due on 11/15/2021  Mammogram due on 10/02/2021  Colonoscopy Never done      Immunization History   Administered Date(s) Administered   • D Family History   Problem Relation Age of Onset   • Heart Disorder Father         Congestive heart failure   • Heart Disease Father    • Diabetes Mother    • Gastro-Intestinal Disorder Mother 76        colon polyps   • Diabetes Sister    • Other (Other) S oropharyngeal exudate. Eyes:      General: Lids are normal. No scleral icterus. Right eye: No discharge. Left eye: No discharge. Extraocular Movements: Extraocular movements intact.       Conjunctiva/sclera: Conjunctivae normal.      P masses.  Bladder is normal    Musculoskeletal:         General: No tenderness or deformity. Normal range of motion. Cervical back: Normal range of motion and neck supple. Right lower leg: No edema. Left lower leg: No edema.    Lymphadenopathy DOPPLER (CPT=93306); Future    Screening for malignant neoplasm of colon  -     OCCULT BLOOD, FECAL, IMMUNOASSAY; Future    Visit for screening mammogram  -     [facility] DARIELA 2D+3D SCREENING BILAT (CPT=77067/82136);  Future    Vitamin D deficiency  -     VITAMIN have.           Routine physical examination - Primary     Normal exam.  Labs as ordered. Skin check normal.  No significant abnormal nevi. Breast exam completed–no palpable abnormalities, discharge from the nipples or axillary adenopathy.   Mammogram and Menopause        Relevant Orders    XR DEXA BONE DENSITOMETRY (CPT=77080)             The patient indicates understanding of these issues and agrees to the plan.     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

## 2021-08-19 NOTE — ASSESSMENT & PLAN NOTE
Large fat collections medial aspect of the thighs and knees making it difficult for patient to lay on her side or approximate her legs. She has been seen by plastic surgery and is planning liposuction.   She is advised to consider a water jet assisted lipo

## 2021-08-19 NOTE — ASSESSMENT & PLAN NOTE
Patient has been on levothyroxine 125 mcg daily.   Continue on the same dose of medication, recheck labs ordered

## 2021-08-19 NOTE — ASSESSMENT & PLAN NOTE
Body mass index is 40.72 kg/m².    Wt Readings from Last 6 Encounters:  08/18/21 : 260 lb (117.9 kg)  10/18/19 : 255 lb (115.7 kg)  11/15/18 : 252 lb (114.3 kg)  10/25/18 : 240 lb (108.9 kg)  10/25/18 : 240 lb (108.9 kg)  09/21/18 : 220 lb (99.8 kg)  Weight

## 2021-08-19 NOTE — ASSESSMENT & PLAN NOTE
Blood pressure 126/84, pulse 86, temperature 97.1 °F (36.2 °C), temperature source Temporal, resp. rate 18, height 5' 7\" (1.702 m), weight 260 lb (117.9 kg), last menstrual period 11/01/2020, unknown if currently breastfeeding. Pressure looks stable.

## 2021-08-19 NOTE — ASSESSMENT & PLAN NOTE
History of COVID-19 infection, diagnosed in November 2020. She did have significant desaturations, persistent fatigue. Continues to have on and off dysosmia. No chest pain. No palpitations. Chest x-ray, EKG, echocardiogram has been ordered.   We will f [FreeTextEntry1] : 5/19: A1c 9.5%, TSH 0.77, prolactin 10.3, tot chol 192, trig 185, HDL 39, , urine microalbumin/cr 20

## 2021-08-19 NOTE — ASSESSMENT & PLAN NOTE
Normal exam.  Labs as ordered. Skin check normal.  No significant abnormal nevi. Breast exam completed–no palpable abnormalities, discharge from the nipples or axillary adenopathy. Mammogram and bone density scan ordered.   No cervical or inguinal lympha

## 2021-08-19 NOTE — PATIENT INSTRUCTIONS
Problem List Items Addressed This Visit        Unprioritized    History of 2019 novel coronavirus disease (COVID-19)     History of COVID-19 infection, diagnosed in November 2020. She did have significant desaturations, persistent fatigue.   Continues to h palpable abnormalities. Hemorrhoids-internal hemorrhoids present. Brown stools,guaic negetive  Colonoscopy is currently due–advised to schedule an appointment. Call [phone] for an appointment with Dr. Abigail Santana, Dr. Nirmala Bedolla, Dr. Jennie Acevedo, Dr. Rosey Marks.   Fit immunog

## 2021-09-18 ENCOUNTER — HOSPITAL ENCOUNTER (OUTPATIENT)
Dept: GENERAL RADIOLOGY | Age: 56
Discharge: HOME | End: 2021-09-18
Attending: INTERNAL MEDICINE
Payer: COMMERCIAL

## 2021-09-18 ENCOUNTER — HOSPITAL ENCOUNTER (OUTPATIENT)
Dept: BONE DENSITY | Age: 56
Discharge: HOME | End: 2021-09-18
Attending: INTERNAL MEDICINE
Payer: COMMERCIAL

## 2021-09-18 ENCOUNTER — EKG ENCOUNTER (OUTPATIENT)
Dept: LAB | Age: 56
End: 2021-09-18
Attending: INTERNAL MEDICINE
Payer: COMMERCIAL

## 2021-09-18 ENCOUNTER — LAB ENCOUNTER (OUTPATIENT)
Dept: LAB | Age: 56
End: 2021-09-18
Attending: INTERNAL MEDICINE
Payer: COMMERCIAL

## 2021-09-18 DIAGNOSIS — Z86.16 HISTORY OF 2019 NOVEL CORONAVIRUS DISEASE (COVID-19): Primary | ICD-10-CM

## 2021-09-18 DIAGNOSIS — E55.9 VITAMIN D DEFICIENCY: ICD-10-CM

## 2021-09-18 DIAGNOSIS — Z86.16 HISTORY OF 2019 NOVEL CORONAVIRUS DISEASE (COVID-19): ICD-10-CM

## 2021-09-18 DIAGNOSIS — D51.9 ANEMIA DUE TO VITAMIN B12 DEFICIENCY, UNSPECIFIED B12 DEFICIENCY TYPE: ICD-10-CM

## 2021-09-18 DIAGNOSIS — E11.9 TYPE 2 DIABETES MELLITUS WITHOUT COMPLICATION, WITHOUT LONG-TERM CURRENT USE OF INSULIN (HCC): ICD-10-CM

## 2021-09-18 DIAGNOSIS — Z78.0 MENOPAUSE: ICD-10-CM

## 2021-09-18 LAB
ALBUMIN SERPL-MCNC: 3.6 G/DL (ref 3.4–5)
ALBUMIN/GLOB SERPL: 0.8 {RATIO} (ref 1–2)
ALP LIVER SERPL-CCNC: 78 U/L
ALT SERPL-CCNC: 24 U/L
ANION GAP SERPL CALC-SCNC: 9 MMOL/L (ref 0–18)
AST SERPL-CCNC: 18 U/L (ref 15–37)
ATRIAL RATE: 89 BPM
BASOPHILS # BLD AUTO: 0.05 X10(3) UL (ref 0–0.2)
BASOPHILS NFR BLD AUTO: 0.6 %
BILIRUB SERPL-MCNC: 0.6 MG/DL (ref 0.1–2)
BILIRUB UR QL STRIP.AUTO: NEGATIVE
BUN BLD-MCNC: 38 MG/DL (ref 7–18)
CALCIUM BLD-MCNC: 9.3 MG/DL (ref 8.5–10.1)
CHLORIDE SERPL-SCNC: 98 MMOL/L (ref 98–112)
CHOLEST SERPL-MCNC: 195 MG/DL (ref ?–200)
CO2 SERPL-SCNC: 29 MMOL/L (ref 21–32)
COLOR UR AUTO: YELLOW
CREAT BLD-MCNC: 1.37 MG/DL
CREAT UR-SCNC: 158 MG/DL
EOSINOPHIL # BLD AUTO: 0.15 X10(3) UL (ref 0–0.7)
EOSINOPHIL NFR BLD AUTO: 1.9 %
ERYTHROCYTE [DISTWIDTH] IN BLOOD BY AUTOMATED COUNT: 13.2 %
EST. AVERAGE GLUCOSE BLD GHB EST-MCNC: 160 MG/DL (ref 68–126)
GLOBULIN PLAS-MCNC: 4.8 G/DL (ref 2.8–4.4)
GLUCOSE BLD-MCNC: 162 MG/DL (ref 70–99)
GLUCOSE UR STRIP.AUTO-MCNC: >=500 MG/DL
HBA1C MFR BLD HPLC: 7.2 % (ref ?–5.7)
HCT VFR BLD AUTO: 36.3 %
HDLC SERPL-MCNC: 63 MG/DL (ref 40–59)
HGB BLD-MCNC: 12 G/DL
IMM GRANULOCYTES # BLD AUTO: 0.02 X10(3) UL (ref 0–1)
IMM GRANULOCYTES NFR BLD: 0.2 %
KETONES UR STRIP.AUTO-MCNC: NEGATIVE MG/DL
LDLC SERPL CALC-MCNC: 104 MG/DL (ref ?–100)
LYMPHOCYTES # BLD AUTO: 3.01 X10(3) UL (ref 1–4)
LYMPHOCYTES NFR BLD AUTO: 37.4 %
MCH RBC QN AUTO: 31.2 PG (ref 26–34)
MCHC RBC AUTO-ENTMCNC: 33.1 G/DL (ref 31–37)
MCV RBC AUTO: 94.3 FL
MICROALBUMIN UR-MCNC: 5.26 MG/DL
MICROALBUMIN/CREAT 24H UR-RTO: 33.3 UG/MG (ref ?–30)
MONOCYTES # BLD AUTO: 0.51 X10(3) UL (ref 0.1–1)
MONOCYTES NFR BLD AUTO: 6.3 %
NEUTROPHILS # BLD AUTO: 4.3 X10 (3) UL (ref 1.5–7.7)
NEUTROPHILS # BLD AUTO: 4.3 X10(3) UL (ref 1.5–7.7)
NEUTROPHILS NFR BLD AUTO: 53.6 %
NITRITE UR QL STRIP.AUTO: NEGATIVE
NONHDLC SERPL-MCNC: 132 MG/DL (ref ?–130)
OSMOLALITY SERPL CALC.SUM OF ELEC: 295 MOSM/KG (ref 275–295)
P AXIS: 49 DEGREES
P-R INTERVAL: 200 MS
PATIENT FASTING Y/N/NP: YES
PATIENT FASTING Y/N/NP: YES
PH UR STRIP.AUTO: 5 [PH] (ref 5–8)
PLATELET # BLD AUTO: 352 10(3)UL (ref 150–450)
POTASSIUM SERPL-SCNC: 3.3 MMOL/L (ref 3.5–5.1)
PROT SERPL-MCNC: 8.4 G/DL (ref 6.4–8.2)
PROT UR STRIP.AUTO-MCNC: NEGATIVE MG/DL
Q-T INTERVAL: 382 MS
QRS DURATION: 96 MS
QTC CALCULATION (BEZET): 464 MS
R AXIS: 0 DEGREES
RBC # BLD AUTO: 3.85 X10(6)UL
RBC UR QL AUTO: NEGATIVE
SODIUM SERPL-SCNC: 136 MMOL/L (ref 136–145)
SP GR UR STRIP.AUTO: 1.02 (ref 1–1.03)
T AXIS: 34 DEGREES
TRIGL SERPL-MCNC: 160 MG/DL (ref 30–149)
TSI SER-ACNC: 1.3 MIU/ML (ref 0.36–3.74)
UROBILINOGEN UR STRIP.AUTO-MCNC: <2 MG/DL
VENTRICULAR RATE: 89 BPM
VIT B12 SERPL-MCNC: 495 PG/ML (ref 193–986)
VIT D+METAB SERPL-MCNC: 38.2 NG/ML (ref 30–100)
VLDLC SERPL CALC-MCNC: 27 MG/DL (ref 0–30)
WBC # BLD AUTO: 8 X10(3) UL (ref 4–11)
WBC #/AREA URNS AUTO: >50 /HPF

## 2021-09-18 PROCEDURE — 80061 LIPID PANEL: CPT

## 2021-09-18 PROCEDURE — 82570 ASSAY OF URINE CREATININE: CPT

## 2021-09-18 PROCEDURE — 71046 X-RAY EXAM CHEST 2 VIEWS: CPT | Performed by: INTERNAL MEDICINE

## 2021-09-18 PROCEDURE — 77080 DXA BONE DENSITY AXIAL: CPT | Performed by: INTERNAL MEDICINE

## 2021-09-18 PROCEDURE — 82607 VITAMIN B-12: CPT

## 2021-09-18 PROCEDURE — 36415 COLL VENOUS BLD VENIPUNCTURE: CPT

## 2021-09-18 PROCEDURE — 82043 UR ALBUMIN QUANTITATIVE: CPT

## 2021-09-18 PROCEDURE — 93010 ELECTROCARDIOGRAM REPORT: CPT | Performed by: INTERNAL MEDICINE

## 2021-09-18 PROCEDURE — 80053 COMPREHEN METABOLIC PANEL: CPT

## 2021-09-18 PROCEDURE — 82306 VITAMIN D 25 HYDROXY: CPT

## 2021-09-18 PROCEDURE — 3051F HG A1C>EQUAL 7.0%<8.0%: CPT | Performed by: INTERNAL MEDICINE

## 2021-09-18 PROCEDURE — 84443 ASSAY THYROID STIM HORMONE: CPT

## 2021-09-18 PROCEDURE — 81001 URINALYSIS AUTO W/SCOPE: CPT

## 2021-09-18 PROCEDURE — 83036 HEMOGLOBIN GLYCOSYLATED A1C: CPT

## 2021-09-18 PROCEDURE — 3061F NEG MICROALBUMINURIA REV: CPT | Performed by: INTERNAL MEDICINE

## 2021-09-18 PROCEDURE — 3060F POS MICROALBUMINURIA REV: CPT | Performed by: INTERNAL MEDICINE

## 2021-09-18 PROCEDURE — 85025 COMPLETE CBC W/AUTO DIFF WBC: CPT

## 2021-09-18 PROCEDURE — 93005 ELECTROCARDIOGRAM TRACING: CPT

## 2021-10-07 ENCOUNTER — HOSPITAL ENCOUNTER (OUTPATIENT)
Dept: CV DIAGNOSTICS | Facility: HOSPITAL | Age: 56
Discharge: HOME | End: 2021-10-07
Attending: INTERNAL MEDICINE
Payer: COMMERCIAL

## 2021-10-07 DIAGNOSIS — Z86.16 HISTORY OF 2019 NOVEL CORONAVIRUS DISEASE (COVID-19): ICD-10-CM

## 2021-10-07 PROCEDURE — 93306 TTE W/DOPPLER COMPLETE: CPT | Performed by: INTERNAL MEDICINE

## 2021-10-23 ENCOUNTER — HOSPITAL ENCOUNTER (OUTPATIENT)
Dept: MAMMOGRAPHY | Age: 56
Discharge: HOME | End: 2021-10-23
Attending: INTERNAL MEDICINE
Payer: COMMERCIAL

## 2021-10-23 DIAGNOSIS — Z12.31 VISIT FOR SCREENING MAMMOGRAM: ICD-10-CM

## 2021-10-23 PROCEDURE — 77067 SCR MAMMO BI INCL CAD: CPT | Performed by: INTERNAL MEDICINE

## 2021-10-23 PROCEDURE — 77063 BREAST TOMOSYNTHESIS BI: CPT | Performed by: INTERNAL MEDICINE

## 2023-04-18 PROBLEM — D12.2 BENIGN NEOPLASM OF ASCENDING COLON: Status: ACTIVE | Noted: 2023-04-18

## 2023-04-18 PROBLEM — Z12.11 SPECIAL SCREENING FOR MALIGNANT NEOPLASM OF COLON: Status: ACTIVE | Noted: 2023-04-18

## 2023-04-24 ENCOUNTER — OFFICE VISIT (OUTPATIENT)
Dept: PODIATRY CLINIC | Facility: CLINIC | Age: 58
End: 2023-04-24

## 2023-04-24 VITALS — DIASTOLIC BLOOD PRESSURE: 80 MMHG | SYSTOLIC BLOOD PRESSURE: 126 MMHG

## 2023-04-24 DIAGNOSIS — M72.2 PLANTAR FASCIITIS OF RIGHT FOOT: Primary | ICD-10-CM

## 2023-04-24 DIAGNOSIS — M72.2 PLANTAR FASCIITIS OF LEFT FOOT: ICD-10-CM

## 2023-04-24 DIAGNOSIS — M79.672 INFLAMMATORY HEEL PAIN, LEFT: ICD-10-CM

## 2023-04-24 DIAGNOSIS — M79.671 INFLAMMATORY HEEL PAIN, RIGHT: ICD-10-CM

## 2023-04-24 DIAGNOSIS — B35.1 ONYCHOMYCOSIS: ICD-10-CM

## 2023-04-24 PROCEDURE — 87101 SKIN FUNGI CULTURE: CPT | Performed by: PODIATRIST

## 2023-04-24 RX ORDER — TRIAMCINOLONE ACETONIDE 40 MG/ML
20 INJECTION, SUSPENSION INTRA-ARTICULAR; INTRAMUSCULAR ONCE
Status: COMPLETED | OUTPATIENT
Start: 2023-04-24 | End: 2023-04-24

## 2023-04-24 RX ADMIN — TRIAMCINOLONE ACETONIDE 20 MG: 40 INJECTION, SUSPENSION INTRA-ARTICULAR; INTRAMUSCULAR at 12:04:00

## 2023-04-24 NOTE — PROGRESS NOTES
Per Dr. Valerie Mills to draw up 0.5 ml of Kenalog 40 and 0.5 ml of 0.5% Marcaine for injection to right heel.

## 2023-05-18 NOTE — PROGRESS NOTES
Overdue lab letter mailed to patients home address listed in EMR. Aklief counseling:  Patient advised to apply a pea-sized amount only at bedtime and wait 30 minutes after washing their face before applying.  If too drying, patient may add a non-comedogenic moisturizer.  The most commonly reported side effects including irritation, redness, scaling, dryness, stinging, burning, itching, and increased risk of sunburn.  The patient verbalized understanding of the proper use and possible adverse effects of retinoids.  All of the patient's questions and concerns were addressed.

## 2024-09-28 ENCOUNTER — APPOINTMENT (OUTPATIENT)
Dept: CT IMAGING | Facility: HOSPITAL | Age: 59
End: 2024-09-28
Attending: EMERGENCY MEDICINE
Payer: COMMERCIAL

## 2024-09-28 ENCOUNTER — APPOINTMENT (OUTPATIENT)
Dept: CT IMAGING | Facility: HOSPITAL | Age: 59
DRG: 661 | End: 2024-09-28
Attending: EMERGENCY MEDICINE
Payer: COMMERCIAL

## 2024-09-28 ENCOUNTER — HOSPITAL ENCOUNTER (INPATIENT)
Facility: HOSPITAL | Age: 59
LOS: 1 days | Discharge: HOME | End: 2024-09-29
Attending: EMERGENCY MEDICINE | Admitting: INTERNAL MEDICINE
Payer: COMMERCIAL

## 2024-09-28 ENCOUNTER — HOSPITAL ENCOUNTER (INPATIENT)
Facility: HOSPITAL | Age: 59
LOS: 1 days | Discharge: HOME | DRG: 661 | End: 2024-09-29
Attending: EMERGENCY MEDICINE | Admitting: INTERNAL MEDICINE
Payer: COMMERCIAL

## 2024-09-28 DIAGNOSIS — N20.1 RIGHT URETERAL STONE: ICD-10-CM

## 2024-09-28 DIAGNOSIS — N20.1 CALCULUS OF URETER: Primary | ICD-10-CM

## 2024-09-28 DIAGNOSIS — R10.9 RIGHT FLANK PAIN: ICD-10-CM

## 2024-09-28 DIAGNOSIS — N17.9 AKI (ACUTE KIDNEY INJURY) (HCC): ICD-10-CM

## 2024-09-28 DIAGNOSIS — N20.0 KIDNEY STONE: ICD-10-CM

## 2024-09-28 DIAGNOSIS — R73.9 HYPERGLYCEMIA: ICD-10-CM

## 2024-09-28 LAB
ALBUMIN SERPL-MCNC: 4.5 G/DL (ref 3.2–4.8)
ALBUMIN/GLOB SERPL: 1.2 {RATIO} (ref 1–2)
ALP LIVER SERPL-CCNC: 88 U/L
ALT SERPL-CCNC: 14 U/L
ANION GAP SERPL CALC-SCNC: 11 MMOL/L (ref 0–18)
AST SERPL-CCNC: 14 U/L (ref ?–34)
BASOPHILS # BLD AUTO: 0.02 X10(3) UL (ref 0–0.2)
BASOPHILS NFR BLD AUTO: 0.2 %
BILIRUB SERPL-MCNC: 0.5 MG/DL (ref 0.3–1.2)
BILIRUB UR QL STRIP.AUTO: NEGATIVE
BUN BLD-MCNC: 26 MG/DL (ref 9–23)
CALCIUM BLD-MCNC: 10.5 MG/DL (ref 8.7–10.4)
CHLORIDE SERPL-SCNC: 104 MMOL/L (ref 98–112)
CLARITY UR REFRACT.AUTO: CLEAR
CO2 SERPL-SCNC: 20 MMOL/L (ref 21–32)
COLOR UR AUTO: COLORLESS
CREAT BLD-MCNC: 1.28 MG/DL
EGFRCR SERPLBLD CKD-EPI 2021: 49 ML/MIN/1.73M2 (ref 60–?)
EOSINOPHIL # BLD AUTO: 0.03 X10(3) UL (ref 0–0.7)
EOSINOPHIL NFR BLD AUTO: 0.4 %
ERYTHROCYTE [DISTWIDTH] IN BLOOD BY AUTOMATED COUNT: 12.5 %
GLOBULIN PLAS-MCNC: 3.7 G/DL (ref 2–3.5)
GLUCOSE BLD-MCNC: 244 MG/DL (ref 70–99)
GLUCOSE BLD-MCNC: 311 MG/DL (ref 70–99)
GLUCOSE BLD-MCNC: 326 MG/DL (ref 70–99)
GLUCOSE UR STRIP.AUTO-MCNC: >1000 MG/DL
HCT VFR BLD AUTO: 36.8 %
HGB BLD-MCNC: 13.2 G/DL
IMM GRANULOCYTES # BLD AUTO: 0.04 X10(3) UL (ref 0–1)
IMM GRANULOCYTES NFR BLD: 0.5 %
LEUKOCYTE ESTERASE UR QL STRIP.AUTO: NEGATIVE
LIPASE SERPL-CCNC: 51 U/L (ref 12–53)
LYMPHOCYTES # BLD AUTO: 0.73 X10(3) UL (ref 1–4)
LYMPHOCYTES NFR BLD AUTO: 8.8 %
MCH RBC QN AUTO: 35.5 PG (ref 26–34)
MCHC RBC AUTO-ENTMCNC: 35.9 G/DL (ref 31–37)
MCV RBC AUTO: 98.9 FL
MONOCYTES # BLD AUTO: 0.48 X10(3) UL (ref 0.1–1)
MONOCYTES NFR BLD AUTO: 5.8 %
NEUTROPHILS # BLD AUTO: 6.97 X10 (3) UL (ref 1.5–7.7)
NEUTROPHILS # BLD AUTO: 6.97 X10(3) UL (ref 1.5–7.7)
NEUTROPHILS NFR BLD AUTO: 84.3 %
NITRITE UR QL STRIP.AUTO: NEGATIVE
OSMOLALITY SERPL CALC.SUM OF ELEC: 297 MOSM/KG (ref 275–295)
PH UR STRIP.AUTO: 5.5 [PH] (ref 5–8)
PLATELET # BLD AUTO: 174 10(3)UL (ref 150–450)
POTASSIUM SERPL-SCNC: 4.1 MMOL/L (ref 3.5–5.1)
PROT SERPL-MCNC: 8.2 G/DL (ref 5.7–8.2)
PROT UR STRIP.AUTO-MCNC: NEGATIVE MG/DL
RBC # BLD AUTO: 3.72 X10(6)UL
SODIUM SERPL-SCNC: 135 MMOL/L (ref 136–145)
SP GR UR STRIP.AUTO: >1.03 (ref 1–1.03)
TROPONIN I SERPL HS-MCNC: <3 NG/L
UROBILINOGEN UR STRIP.AUTO-MCNC: NORMAL MG/DL
WBC # BLD AUTO: 8.3 X10(3) UL (ref 4–11)

## 2024-09-28 PROCEDURE — 85025 COMPLETE CBC W/AUTO DIFF WBC: CPT | Performed by: EMERGENCY MEDICINE

## 2024-09-28 PROCEDURE — 99285 EMERGENCY DEPT VISIT HI MDM: CPT

## 2024-09-28 PROCEDURE — 82962 GLUCOSE BLOOD TEST: CPT

## 2024-09-28 PROCEDURE — 93010 ELECTROCARDIOGRAM REPORT: CPT

## 2024-09-28 PROCEDURE — 96361 HYDRATE IV INFUSION ADD-ON: CPT

## 2024-09-28 PROCEDURE — 83690 ASSAY OF LIPASE: CPT | Performed by: EMERGENCY MEDICINE

## 2024-09-28 PROCEDURE — 81001 URINALYSIS AUTO W/SCOPE: CPT | Performed by: EMERGENCY MEDICINE

## 2024-09-28 PROCEDURE — 71275 CT ANGIOGRAPHY CHEST: CPT | Performed by: EMERGENCY MEDICINE

## 2024-09-28 PROCEDURE — 84484 ASSAY OF TROPONIN QUANT: CPT | Performed by: EMERGENCY MEDICINE

## 2024-09-28 PROCEDURE — 74177 CT ABD & PELVIS W/CONTRAST: CPT | Performed by: EMERGENCY MEDICINE

## 2024-09-28 PROCEDURE — 96374 THER/PROPH/DIAG INJ IV PUSH: CPT

## 2024-09-28 PROCEDURE — 80053 COMPREHEN METABOLIC PANEL: CPT | Performed by: EMERGENCY MEDICINE

## 2024-09-28 PROCEDURE — 96375 TX/PRO/DX INJ NEW DRUG ADDON: CPT

## 2024-09-28 PROCEDURE — 93005 ELECTROCARDIOGRAM TRACING: CPT

## 2024-09-28 PROCEDURE — 96376 TX/PRO/DX INJ SAME DRUG ADON: CPT

## 2024-09-28 RX ORDER — SEMAGLUTIDE 1.34 MG/ML
1 INJECTION, SOLUTION SUBCUTANEOUS WEEKLY
COMMUNITY
Start: 2024-07-05

## 2024-09-28 RX ORDER — NICOTINE POLACRILEX 4 MG
30 LOZENGE BUCCAL
Status: DISCONTINUED | OUTPATIENT
Start: 2024-09-28 | End: 2024-09-29

## 2024-09-28 RX ORDER — MORPHINE SULFATE 4 MG/ML
4 INJECTION, SOLUTION INTRAMUSCULAR; INTRAVENOUS EVERY 4 HOURS PRN
Status: DISCONTINUED | OUTPATIENT
Start: 2024-09-28 | End: 2024-09-29

## 2024-09-28 RX ORDER — ACETAMINOPHEN 500 MG
1000 TABLET ORAL EVERY 8 HOURS
Status: DISCONTINUED | OUTPATIENT
Start: 2024-09-28 | End: 2024-09-29

## 2024-09-28 RX ORDER — KETOROLAC TROMETHAMINE 15 MG/ML
15 INJECTION, SOLUTION INTRAMUSCULAR; INTRAVENOUS EVERY 6 HOURS PRN
Status: DISCONTINUED | OUTPATIENT
Start: 2024-09-28 | End: 2024-09-29

## 2024-09-28 RX ORDER — ANASTROZOLE 1 MG/1
1 TABLET ORAL DAILY
Status: DISCONTINUED | OUTPATIENT
Start: 2024-09-28 | End: 2024-09-29

## 2024-09-28 RX ORDER — TAMSULOSIN HYDROCHLORIDE 0.4 MG/1
0.4 CAPSULE ORAL
Status: DISCONTINUED | OUTPATIENT
Start: 2024-09-28 | End: 2024-09-29

## 2024-09-28 RX ORDER — SODIUM CHLORIDE, SODIUM LACTATE, POTASSIUM CHLORIDE, CALCIUM CHLORIDE 600; 310; 30; 20 MG/100ML; MG/100ML; MG/100ML; MG/100ML
INJECTION, SOLUTION INTRAVENOUS CONTINUOUS
Status: DISCONTINUED | OUTPATIENT
Start: 2024-09-28 | End: 2024-09-29

## 2024-09-28 RX ORDER — HYDROMORPHONE HYDROCHLORIDE 1 MG/ML
0.5 INJECTION, SOLUTION INTRAMUSCULAR; INTRAVENOUS; SUBCUTANEOUS ONCE
Status: DISCONTINUED | OUTPATIENT
Start: 2024-09-28 | End: 2024-09-28

## 2024-09-28 RX ORDER — MORPHINE SULFATE 4 MG/ML
4 INJECTION, SOLUTION INTRAMUSCULAR; INTRAVENOUS ONCE
Status: DISCONTINUED | OUTPATIENT
Start: 2024-09-28 | End: 2024-09-29

## 2024-09-28 RX ORDER — KETOROLAC TROMETHAMINE 15 MG/ML
15 INJECTION, SOLUTION INTRAMUSCULAR; INTRAVENOUS ONCE
Status: COMPLETED | OUTPATIENT
Start: 2024-09-28 | End: 2024-09-28

## 2024-09-28 RX ORDER — MORPHINE SULFATE 4 MG/ML
4 INJECTION, SOLUTION INTRAMUSCULAR; INTRAVENOUS ONCE
Status: COMPLETED | OUTPATIENT
Start: 2024-09-28 | End: 2024-09-28

## 2024-09-28 RX ORDER — ANASTROZOLE 1 MG/1
1 TABLET ORAL DAILY
COMMUNITY
Start: 2024-08-01 | End: 2025-07-27

## 2024-09-28 RX ORDER — ONDANSETRON 4 MG/1
4 TABLET, FILM COATED ORAL EVERY 8 HOURS PRN
Status: DISCONTINUED | OUTPATIENT
Start: 2024-09-28 | End: 2024-09-29

## 2024-09-28 RX ORDER — NICOTINE POLACRILEX 4 MG
15 LOZENGE BUCCAL
Status: DISCONTINUED | OUTPATIENT
Start: 2024-09-28 | End: 2024-09-29

## 2024-09-28 RX ORDER — DEXTROSE MONOHYDRATE 25 G/50ML
50 INJECTION, SOLUTION INTRAVENOUS
Status: DISCONTINUED | OUTPATIENT
Start: 2024-09-28 | End: 2024-09-29

## 2024-09-28 RX ORDER — ONDANSETRON 2 MG/ML
4 INJECTION INTRAMUSCULAR; INTRAVENOUS ONCE
Status: COMPLETED | OUTPATIENT
Start: 2024-09-28 | End: 2024-09-28

## 2024-09-28 RX ORDER — MORPHINE SULFATE 4 MG/ML
2 INJECTION, SOLUTION INTRAMUSCULAR; INTRAVENOUS ONCE
Status: DISCONTINUED | OUTPATIENT
Start: 2024-09-28 | End: 2024-09-28

## 2024-09-28 RX ORDER — LEVOTHYROXINE SODIUM 125 UG/1
125 TABLET ORAL
Status: DISCONTINUED | OUTPATIENT
Start: 2024-09-29 | End: 2024-09-29

## 2024-09-28 RX ORDER — MORPHINE SULFATE 4 MG/ML
2 INJECTION, SOLUTION INTRAMUSCULAR; INTRAVENOUS EVERY 4 HOURS PRN
Status: DISCONTINUED | OUTPATIENT
Start: 2024-09-28 | End: 2024-09-29

## 2024-09-28 RX ORDER — MORPHINE SULFATE 4 MG/ML
INJECTION, SOLUTION INTRAMUSCULAR; INTRAVENOUS
Status: DISPENSED
Start: 2024-09-28 | End: 2024-09-29

## 2024-09-28 RX ORDER — HEPARIN SODIUM 5000 [USP'U]/ML
5000 INJECTION, SOLUTION INTRAVENOUS; SUBCUTANEOUS EVERY 8 HOURS SCHEDULED
Status: DISCONTINUED | OUTPATIENT
Start: 2024-09-28 | End: 2024-09-29

## 2024-09-28 RX ORDER — INSULIN ASPART 100 [IU]/ML
INJECTION, SOLUTION INTRAVENOUS; SUBCUTANEOUS
Status: DISCONTINUED | OUTPATIENT
Start: 2024-09-28 | End: 2024-09-28

## 2024-09-28 NOTE — ED QUICK NOTES
Pt on call light stating pain has become excruciating. Pt dry heaving at this time. Fluid Bolus completed.  ER MD at bedside with US machine. CT imaging expedited due to pt status.

## 2024-09-28 NOTE — H&P
[facility] Hospitalist H&P       CC:   Chief Complaint   Patient presents with    Abdomen/Flank Pain        PCP: Lesa Lakhani MD    History of Present Illness: Patient is a 59 year old female with a history of triple negative breast cancer on anastrozole, non-insulin-dependent type 2 diabetes, hypertension, hyperlipidemia, hypothyroidism who is presenting to the hospital with right flank pain.  Patient notes that she was at home this morning when she suddenly started to experience right lower back pain which started to radiate to her right lower abdomen.  She denies having these types of symptoms before.  Notes that she has been able to urinate properly, denies any blood in her urine.  Endorses having nausea and a few episodes of vomiting today as well.  Denies any changes in appetite, fevers, chills, diarrhea, chest pain, shortness of breath.  Given the worsening pain she came to the ER for further evaluation.    On arrival, vital signs were normal.  Labs significant for glucose of 326, bicarb 20, creatinine 1.28 (similar to baseline), no leukocytosis.  CTA chest/abdomen/pelvis showed 5 mm proximal right ureteral calculus with mild right hydronephrosis, alveolar opacities in the right upper lobe and lower lobes consistent with possible inflammation/infection, moderate colonic stool.  Patient received IV fluids, urology consulted.  Admitted for right kidney stone.    PMH  Past Medical History:    Diabetes (HCC)    Diabetes mellitus (HCC)    High cholesterol    Hypertension    Hypothyroidism    Lipid screening    PER:ng    Lymphedema    Menometrorrhagia    Wears glasses        PSH  Past Surgical History:   Procedure Laterality Date    Biopsy of uterus lining  2009    Neg endometrial biopsy    Other surgical history  2004    varicose vein stripping    Skin surgery  5-8-15    [facility]      Skin surgery  09/20/2018    Mohs /Left medial cheek /BCC        ALL:  Allergies   Allergen Reactions    Amoxicillin  RASH     [facility]ner Allergy Text Annotation: amoxicillin    Penicillins RASH     Other reaction(s): PENICILLINS        Home Medications:  Outpatient Medications Marked as Taking for the 9/28/24 encounter (Hospital Encounter)   Medication Sig Dispense Refill    anastrozole 1 MG Oral Tab tab Take 1 tablet (1 mg total) by mouth daily.      OZEMPIC, 1 MG/DOSE, 4 MG/3ML Subcutaneous Solution Pen-injector Inject 1 mg into the skin once a week.      Empagliflozin 25 MG Oral Tab Take 25 mg by mouth daily.      Cyanocobalamin (VITAMIN B 12) 250 MCG Oral Lozenge Take 250 mcg by mouth daily.      glimepiride 4 MG Oral Tab Take 1 tablet (4 mg total) by mouth daily.      MetFORMIN HCl 1000 MG Oral Tab TAKE ONE TABLET BY MOUTH TWO TIMES A DAY WITH MEALS      Vitamin B-12 500 MCG Oral Tab Take 1 tablet (500 mcg total) by mouth daily.      Cholecalciferol (VITAMIN D) 125 MCG (5000 UT) Oral Cap Take 1 capsule (5,000 Units total) by mouth daily.      Levothyroxine Sodium (SYNTHROID, LEVOTHROID) 125 MCG Oral Tab Take 1 tablet (125 mcg total) by mouth before breakfast.           Soc Hx  Social History     Tobacco Use    Smoking status: Never    Smokeless tobacco: Never   Substance Use Topics    Alcohol use: Not Currently     Comment: 1 drink/month        Fam Hx  Family History   Problem Relation Age of Onset    Heart Disorder Father         Congestive heart failure    Heart Disease Father     Diabetes Mother     Gastro-Intestinal Disorder Mother 75        colon polyps    Diabetes Sister     Other (Other) Sister         lymphedema    Heart Disease Paternal Grandmother         CAD    Breast Cancer Paternal Aunt         60s    Breast Cancer Paternal Aunt         60s or older    Glaucoma Neg        Review of Systems  Comprehensive ROS reviewed and negative except for what's stated above.     OBJECTIVE:  /82   Pulse 96   Temp 97 °F (36.1 °C)   Resp 22   Ht 5' 7\" (1.702 m)   Wt 230 lb (104.3 kg)   LMP 11/01/2020   SpO2 100%   BMI  36.02 kg/m²   General:  Alert, in distress due to pain   Head:  Normocephalic, without obvious abnormality, atraumatic.   Eyes:  Sclera anicteric, No conjunctival pallor, EOMs intact.    Nose: Nares normal. Septum midline. Mucosa normal. No drainage.   Throat: Lips, mucosa, and tongue normal. Teeth and gums normal.   Neck: Supple, symmetrical, trachea midline, no cervical or supraclavicular lymph adenopathy, thyroid: no enlargment/tenderness/nodules appreciated   Lungs:   Clear to auscultation bilaterally. Normal effort   Chest wall:  No tenderness or deformity.   Heart:  Regular rate and rhythm, S1, S2 normal, no murmur, rub or gallop appreciated   Abdomen:   Soft, tenderness to palpation in right lower quadrant.  Bowel sounds normal. No masses,  No organomegaly. Non distended.  Right flank tenderness noted.   Extremities: Extremities normal, atraumatic, no cyanosis or edema.   Skin: Skin color, texture, turgor normal. No rashes or lesions.    Neurologic: Normal strength, no focal deficit appreciated     Diagnostic Data:    CBC/Chem  Recent Labs   Lab 09/28/24  1136   WBC 8.3   HGB 13.2   MCV 98.9   .0       Recent Labs   Lab 09/28/24  1136   *   K 4.1      CO2 20.0*   BUN 26*   CREATSERUM 1.28*   *   CA 10.5*       Recent Labs   Lab 09/28/24  1136   ALT 14   AST 14   ALB 4.5       No results for input(s): \"TROP\" in the last 168 hours.      Radiology: CTA CHEST + CT ABD (W) + CT PEL (W) (CPT=71275/23808)    Result Date: 9/28/2024  CONCLUSION:  1. 5 mm proximal right ureteral calculus with mild right hydronephrosis. 2. Patchy alveolar opacities in right upper lobe and superior segment of right lower lobe the appearance is most consistent with infection or inflammation, differential considerations include pneumonia and radiation change. 3. Hepatic steatosis. 4. Small hiatal hernia. 5. Moderate colonic stool. 6. Postsurgical changes in right breast and right axilla   LOCATION:  [facility]    Dictated by (CST): Thien Taveras MD on 9/28/2024 at 1:22 PM     Finalized by (CST): Thien Taveras MD on 9/28/2024 at 1:34 PM          ASSESSMENT / PLAN:     59 year old female with a history of triple negative breast cancer on anastrozole, non-insulin-dependent type 2 diabetes, hypertension, hyperlipidemia, hypothyroidism who is presenting to the hospital with right flank pain And found to have 5 mm right ureteral calculus with hydronephrosis.  Admitted for further management with urology on consult.    5 mm proximal right ureteral calculus  Mild right hydronephrosis  -Right flank pain explained by above  -Urology consulted, n.p.o. after midnight for possible procedure tomorrow  -IV fluids  -Scheduled Tylenol, as needed morphine  -As needed Zofran  -Flomax started  -Continue to monitor patient overnight    Non-insulin-dependent type 2 diabetes  Hyperglycemia  -Sugars greater than 300 upon arrival  -Holding home diabetes medications  -5 units lispro given, continue to monitor with moderate sliding scale and make adjustments as necessary    Breast cancer  -Continue anastrozole 1 mg daily    Hypothyroidism  -Continue levothyroxine 125 mcg    Disposition: Patient admitted for right kidney stone, urology consulted.  N.p.o. after midnight.  Anticipate discharge in the next 24-48 hours pending clinical improvement.      Thank You,  Milvia Mariee DO    Hospitalist with [facility]  Answering Service number: [phone]

## 2024-09-28 NOTE — ED QUICK NOTES
This RN went to reconnect the pt to the 500mL fluid bolus. Pt states \"do I have to I already gave my urine sample\". Bruce RN verified with ED MD pt does not need to finish the 500mL bolus. Pt received 400mL out of the 500mL.

## 2024-09-28 NOTE — ED QUICK NOTES
Orders for admission, patient is aware of plan and ready to go upstairs. Any questions, please call ED DWIGHT Crabtree at extension [phone].     Patient Covid vaccination status: Fully vaccinated     COVID Test Ordered in ED: None    COVID Suspicion at Admission: N/A    Running Infusions:  None    Mental Status/LOC at time of transport: A&Ox4    Other pertinent information:   CIWA score: N/A   NIH score:  N/A

## 2024-09-28 NOTE — ED QUICK NOTES
Pt on call light stating she feels like she is going to pass out. Pt states \"I feel light headed\"     This writer and DWIGHT John at bedside. Following this, Glucose checked- Accu chek reading 311, vitals re established, warm blankets given, IV Bolus started by DWIGHT John. Pt appears in pain, tremors noted.

## 2024-09-28 NOTE — PROGRESS NOTES
Orders for admission, patient is aware of plan and ready to go upstairs. Any questions, please call ED DWIGHT Crabtree at extension [phone].     Patient Covid vaccination status: Fully vaccinated     COVID Test Ordered in ED: None    COVID Suspicion at Admission: N/A    Running Infusions:    lactated ringers      None    Mental Status/LOC at time of transport: A&Ox4    Other pertinent information:   CIWA score: N/A   NIH score:  N/A

## 2024-09-28 NOTE — ED PROVIDER NOTES
Patient Seen in: [facility] Emergency Department      History     Chief Complaint   Patient presents with    Abdomen/Flank Pain     Stated Complaint: pain to right lower back starting a few hours ago.  pt states heart is skipping    Subjective:   HPI  Patient is a 59-year-old female with a history of hypertension, diabetes, hypothyroidism, hyperlipidemia, breast cancer in remission who presents to the ED for evaluation of severe right flank pain starting a few hours prior to arrival.  Patient states that she woke up and then developed pain shortly after this.  She is never had similar pain before.  Patient reports the pain is constant and sharp.  She does not take any medicine for the pain.  Denies any alleviating factors.  She reports she feels lightheaded and short of breath because of the pain.  She notes nausea and few episodes of nonbloody nonbilious emesis.  Last bowel movement was yesterday and patient reports some constipation.  Denies any history of kidney stones.    Objective:   No pertinent past medical history.            No pertinent past surgical history.              No pertinent social history.            Review of Systems    Positive for stated Chief Complaint: Abdomen/Flank Pain    Other systems are as noted in HPI.  Constitutional and vital signs reviewed.      All other systems reviewed and negative except as noted above.    Physical Exam     ED Triage Vitals   BP 09/28/24 1116 147/69   Pulse 09/28/24 1116 73   Resp 09/28/24 1116 20   Temp 09/28/24 1116 97 °F (36.1 °C)   Temp src --    SpO2 09/28/24 1116 100 %   O2 Device 09/28/24 1515 None (Room air)       Current Vitals:   Vital Signs  BP: 159/82  Pulse: 94  Resp: 20  Temp: 97 °F (36.1 °C)  MAP (mmHg): (!) 107    Oxygen Therapy  SpO2: 91 %  O2 Device: None (Room air)        Physical Exam  Vitals and nursing note reviewed.   Constitutional:       Comments: Appears uncomfortable due to pain   HENT:      Head: Normocephalic and atraumatic.       Mouth/Throat:      Mouth: Mucous membranes are moist.   Eyes:      Extraocular Movements: Extraocular movements intact.      Pupils: Pupils are equal, round, and reactive to light.   Cardiovascular:      Rate and Rhythm: Normal rate and regular rhythm.   Pulmonary:      Effort: Pulmonary effort is normal.   Abdominal:      General: There is no distension.      Palpations: Abdomen is soft.      Tenderness: There is no abdominal tenderness. There is no right CVA tenderness or left CVA tenderness.   Musculoskeletal:      Cervical back: Neck supple.      Right lower leg: No edema.      Left lower leg: No edema.   Skin:     General: Skin is warm and dry.      Capillary Refill: Capillary refill takes less than 2 seconds.   Neurological:      General: No focal deficit present.      Mental Status: She is alert.   Psychiatric:         Mood and Affect: Mood normal.               ED Course     Labs Reviewed   COMP METABOLIC PANEL (14) - Abnormal; Notable for the following components:       Result Value    Glucose 326 (*)     Sodium 135 (*)     CO2 20.0 (*)     BUN 26 (*)     Creatinine 1.28 (*)     Calcium, Total 10.5 (*)     Calculated Osmolality 297 (*)     eGFR-Cr 49 (*)     Globulin  3.7 (*)     All other components within normal limits   CBC WITH DIFFERENTIAL WITH PLATELET - Abnormal; Notable for the following components:    RBC 3.72 (*)     MCH 35.5 (*)     Lymphocyte Absolute 0.73 (*)     All other components within normal limits   URINALYSIS, ROUTINE - Abnormal; Notable for the following components:    Urine Color Colorless (*)     Spec Gravity >1.030 (*)     Glucose Urine >1000 (*)     Ketones Urine Trace (*)     Blood Urine Trace (*)     WBC Urine 6-10 (*)     Bacteria Urine Rare (*)     Squamous Epi. Cells Few (*)     All other components within normal limits   POCT GLUCOSE - Abnormal; Notable for the following components:    POC Glucose 311 (*)     All other components within normal limits   POCT GLUCOSE -  Abnormal; Notable for the following components:    POC Glucose 244 (*)     All other components within normal limits   LIPASE - Normal   TROPONIN I HIGH SENSITIVITY - Normal       EKG    Rate, intervals and axes as noted on EKG Report.  Rate: 86  Rhythm: Sinus Rhythm  Reading: NSR with ventricular rate of 86, , no acute ST changes         MDM      Patient is a 59-year-old female with a history of hypertension, diabetes, hypothyroidism, hyperlipidemia, breast cancer in remission who presents to the ED for evaluation of severe right flank pain starting a few hours prior to arrival.  Patient is afebrile, HDS, satting well on RA. On exam patient feels uncomfortable due to pain. No reproducible TTP. Bedside US shows no evidence of AAA. Negative FAST. Possibly R sided hydronephrosis on US. Suspect most likely kidney stone with presentation. However given severity of pain associated with lightheadedness. Will obtain CTA chest/abd/pelvis to evaluate for other pathology in addition to labs, UA. Will give IV morphine, zofran, IVF and re-evaluate.       Admission disposition: 9/28/2024  4:57 PM           ED Course as of 09/28/24 1742  ------------------------------------------------------------  Time: 09/28 1242  Comment: Pt c/o severe pain after IV morphine. She does not want IV dilaudid but will give another dose of morphine. Paged CT to take patient over as soon as possible.   ------------------------------------------------------------  Time: 09/28 1419  Comment: CONCLUSION:    1. 5 mm proximal right ureteral calculus with mild right hydronephrosis.   2. Patchy alveolar opacities in right upper lobe and superior segment of right lower lobe the appearance is most consistent with infection or inflammation, differential considerations include pneumonia and radiation change.   3. Hepatic steatosis.   4. Small hiatal hernia.   5. Moderate colonic stool.   6. Postsurgical changes in right breast and right axilla          ------------------------------------------------------------  Time: 09/28 1438  Comment: Patient reevaluated and is feeling better.  Waiting on UA sample.  I discussed CT findings.  Labs reviewed.  CBC shows no leukocytosis, normal hemoglobin.  CMP shows creatinine of 1.28, which is improved from patient's baseline.  Bicarb 28, likely from vomiting.  Normal blood sugar and anion gap.  Lipase normal.  Troponin negative.  ------------------------------------------------------------  Time: 09/28 9745  Comment: Patient had increased pain again that was severe.  She was given additional dose of IV morphine and 50 mg of Toradol.  Discussed with urology, will keep NPO.  Ordered SSI for hyperglycemia.  Patient's repeat blood sugar after IV fluids was 244.  Patient was admitted to [facility].  Upon reevaluation, patient's pain did improve with medications.  She was updated on plan of care.                              Medical Decision Making      Disposition and Plan     Clinical Impression:  1. Calculus of ureter    2. Hyperglycemia    3. Right flank pain         Disposition:  Admit  9/28/2024  4:57 pm    Follow-up:  No follow-up provider specified.        Medications Prescribed:  Current Discharge Medication List                            Hospital Problems       Present on Admission  Date Reviewed: 4/24/2023            ICD-10-CM Noted POA    Kidney stone N20.0 9/28/2024 Unknown

## 2024-09-29 ENCOUNTER — ANESTHESIA EVENT (OUTPATIENT)
Dept: SURGERY | Facility: HOSPITAL | Age: 59
End: 2024-09-29
Payer: COMMERCIAL

## 2024-09-29 ENCOUNTER — APPOINTMENT (OUTPATIENT)
Dept: GENERAL RADIOLOGY | Facility: HOSPITAL | Age: 59
DRG: 661 | End: 2024-09-29
Attending: UROLOGY
Payer: COMMERCIAL

## 2024-09-29 ENCOUNTER — APPOINTMENT (OUTPATIENT)
Dept: GENERAL RADIOLOGY | Facility: HOSPITAL | Age: 59
End: 2024-09-29
Attending: UROLOGY
Payer: COMMERCIAL

## 2024-09-29 ENCOUNTER — ANESTHESIA (OUTPATIENT)
Dept: SURGERY | Facility: HOSPITAL | Age: 59
End: 2024-09-29
Payer: COMMERCIAL

## 2024-09-29 VITALS
DIASTOLIC BLOOD PRESSURE: 67 MMHG | WEIGHT: 230 LBS | TEMPERATURE: 98 F | RESPIRATION RATE: 18 BRPM | HEART RATE: 100 BPM | BODY MASS INDEX: 36.1 KG/M2 | SYSTOLIC BLOOD PRESSURE: 133 MMHG | HEIGHT: 67 IN | OXYGEN SATURATION: 96 %

## 2024-09-29 PROBLEM — R10.9 RIGHT FLANK PAIN: Status: RESOLVED | Noted: 2024-09-28 | Resolved: 2024-09-29

## 2024-09-29 PROBLEM — R73.9 HYPERGLYCEMIA: Status: RESOLVED | Noted: 2024-09-28 | Resolved: 2024-09-29

## 2024-09-29 PROBLEM — N20.1 CALCULUS OF URETER: Status: RESOLVED | Noted: 2024-09-28 | Resolved: 2024-09-29

## 2024-09-29 PROBLEM — N20.0 KIDNEY STONE: Status: RESOLVED | Noted: 2024-09-28 | Resolved: 2024-09-29

## 2024-09-29 LAB
ANION GAP SERPL CALC-SCNC: 10 MMOL/L (ref 0–18)
BUN BLD-MCNC: 25 MG/DL (ref 9–23)
CALCIUM BLD-MCNC: 9.5 MG/DL (ref 8.7–10.4)
CHLORIDE SERPL-SCNC: 105 MMOL/L (ref 98–112)
CO2 SERPL-SCNC: 23 MMOL/L (ref 21–32)
CREAT BLD-MCNC: 1.52 MG/DL
EGFRCR SERPLBLD CKD-EPI 2021: 40 ML/MIN/1.73M2 (ref 60–?)
ERYTHROCYTE [DISTWIDTH] IN BLOOD BY AUTOMATED COUNT: 12.9 %
GLUCOSE BLD-MCNC: 183 MG/DL (ref 70–99)
GLUCOSE BLD-MCNC: 198 MG/DL (ref 70–99)
GLUCOSE BLD-MCNC: 208 MG/DL (ref 70–99)
GLUCOSE BLD-MCNC: 214 MG/DL (ref 70–99)
HCT VFR BLD AUTO: 32.1 %
HGB BLD-MCNC: 11.5 G/DL
MAGNESIUM SERPL-MCNC: 1.7 MG/DL (ref 1.6–2.6)
MCH RBC QN AUTO: 35.5 PG (ref 26–34)
MCHC RBC AUTO-ENTMCNC: 35.8 G/DL (ref 31–37)
MCV RBC AUTO: 99.1 FL
OSMOLALITY SERPL CALC.SUM OF ELEC: 296 MOSM/KG (ref 275–295)
PLATELET # BLD AUTO: 144 10(3)UL (ref 150–450)
POTASSIUM SERPL-SCNC: 3.7 MMOL/L (ref 3.5–5.1)
RBC # BLD AUTO: 3.24 X10(6)UL
SODIUM SERPL-SCNC: 138 MMOL/L (ref 136–145)
WBC # BLD AUTO: 7.9 X10(3) UL (ref 4–11)

## 2024-09-29 PROCEDURE — 0T768DZ DILATION OF RIGHT URETER WITH INTRALUMINAL DEVICE, VIA NATURAL OR ARTIFICIAL OPENING ENDOSCOPIC: ICD-10-PCS | Performed by: UROLOGY

## 2024-09-29 PROCEDURE — 82962 GLUCOSE BLOOD TEST: CPT

## 2024-09-29 PROCEDURE — 76000 FLUOROSCOPY <1 HR PHYS/QHP: CPT | Performed by: UROLOGY

## 2024-09-29 PROCEDURE — 82365 CALCULUS SPECTROSCOPY: CPT | Performed by: UROLOGY

## 2024-09-29 PROCEDURE — 88300 SURGICAL PATH GROSS: CPT | Performed by: UROLOGY

## 2024-09-29 PROCEDURE — BT1D1ZZ FLUOROSCOPY OF RIGHT KIDNEY, URETER AND BLADDER USING LOW OSMOLAR CONTRAST: ICD-10-PCS | Performed by: UROLOGY

## 2024-09-29 PROCEDURE — 83735 ASSAY OF MAGNESIUM: CPT

## 2024-09-29 PROCEDURE — 80048 BASIC METABOLIC PNL TOTAL CA: CPT

## 2024-09-29 PROCEDURE — 0TC68ZZ EXTIRPATION OF MATTER FROM RIGHT URETER, VIA NATURAL OR ARTIFICIAL OPENING ENDOSCOPIC: ICD-10-PCS | Performed by: UROLOGY

## 2024-09-29 PROCEDURE — 85027 COMPLETE CBC AUTOMATED: CPT

## 2024-09-29 DEVICE — URETERAL STENT
Type: IMPLANTABLE DEVICE | Site: URETER | Status: FUNCTIONAL
Brand: ASCERTA™

## 2024-09-29 RX ORDER — SODIUM CHLORIDE 9 MG/ML
INJECTION, SOLUTION INTRAVENOUS CONTINUOUS
Status: DISCONTINUED | OUTPATIENT
Start: 2024-09-29 | End: 2024-09-29

## 2024-09-29 RX ORDER — LIDOCAINE HYDROCHLORIDE 20 MG/ML
JELLY TOPICAL AS NEEDED
Status: DISCONTINUED | OUTPATIENT
Start: 2024-09-29 | End: 2024-09-29 | Stop reason: HOSPADM

## 2024-09-29 RX ORDER — ONDANSETRON 2 MG/ML
4 INJECTION INTRAMUSCULAR; INTRAVENOUS EVERY 6 HOURS PRN
Status: DISCONTINUED | OUTPATIENT
Start: 2024-09-29 | End: 2024-09-29 | Stop reason: HOSPADM

## 2024-09-29 RX ORDER — ONDANSETRON 2 MG/ML
INJECTION INTRAMUSCULAR; INTRAVENOUS AS NEEDED
Status: DISCONTINUED | OUTPATIENT
Start: 2024-09-29 | End: 2024-09-29 | Stop reason: SURG

## 2024-09-29 RX ORDER — HYDROCODONE BITARTRATE AND ACETAMINOPHEN 5; 325 MG/1; MG/1
2 TABLET ORAL ONCE AS NEEDED
Status: DISCONTINUED | OUTPATIENT
Start: 2024-09-29 | End: 2024-09-29 | Stop reason: HOSPADM

## 2024-09-29 RX ORDER — SODIUM CHLORIDE 9 MG/ML
INJECTION, SOLUTION INTRAVENOUS CONTINUOUS PRN
Status: DISCONTINUED | OUTPATIENT
Start: 2024-09-29 | End: 2024-09-29 | Stop reason: SURG

## 2024-09-29 RX ORDER — ONDANSETRON 4 MG/1
4 TABLET, ORALLY DISINTEGRATING ORAL EVERY 6 HOURS PRN
Status: DISCONTINUED | OUTPATIENT
Start: 2024-09-29 | End: 2024-09-29

## 2024-09-29 RX ORDER — LIDOCAINE HYDROCHLORIDE 10 MG/ML
INJECTION, SOLUTION EPIDURAL; INFILTRATION; INTRACAUDAL; PERINEURAL AS NEEDED
Status: DISCONTINUED | OUTPATIENT
Start: 2024-09-29 | End: 2024-09-29 | Stop reason: SURG

## 2024-09-29 RX ORDER — TRAMADOL HYDROCHLORIDE 50 MG/1
50 TABLET ORAL EVERY 6 HOURS PRN
Status: DISCONTINUED | OUTPATIENT
Start: 2024-09-29 | End: 2024-09-29

## 2024-09-29 RX ORDER — NALOXONE HYDROCHLORIDE 0.4 MG/ML
0.08 INJECTION, SOLUTION INTRAMUSCULAR; INTRAVENOUS; SUBCUTANEOUS AS NEEDED
Status: DISCONTINUED | OUTPATIENT
Start: 2024-09-29 | End: 2024-09-29 | Stop reason: HOSPADM

## 2024-09-29 RX ORDER — PROCHLORPERAZINE EDISYLATE 5 MG/ML
5 INJECTION INTRAMUSCULAR; INTRAVENOUS EVERY 8 HOURS PRN
Status: DISCONTINUED | OUTPATIENT
Start: 2024-09-29 | End: 2024-09-29 | Stop reason: HOSPADM

## 2024-09-29 RX ORDER — HYDROMORPHONE HYDROCHLORIDE 1 MG/ML
0.6 INJECTION, SOLUTION INTRAMUSCULAR; INTRAVENOUS; SUBCUTANEOUS EVERY 5 MIN PRN
Status: DISCONTINUED | OUTPATIENT
Start: 2024-09-29 | End: 2024-09-29 | Stop reason: HOSPADM

## 2024-09-29 RX ORDER — ACETAMINOPHEN 325 MG/1
650 TABLET ORAL EVERY 6 HOURS PRN
Status: DISCONTINUED | OUTPATIENT
Start: 2024-09-29 | End: 2024-09-29

## 2024-09-29 RX ORDER — SCOLOPAMINE TRANSDERMAL SYSTEM 1 MG/1
PATCH, EXTENDED RELEASE TRANSDERMAL AS NEEDED
Status: DISCONTINUED | OUTPATIENT
Start: 2024-09-29 | End: 2024-09-29 | Stop reason: SURG

## 2024-09-29 RX ORDER — ONDANSETRON 2 MG/ML
4 INJECTION INTRAMUSCULAR; INTRAVENOUS EVERY 6 HOURS PRN
Status: DISCONTINUED | OUTPATIENT
Start: 2024-09-29 | End: 2024-09-29

## 2024-09-29 RX ORDER — DEXTROSE MONOHYDRATE 25 G/50ML
50 INJECTION, SOLUTION INTRAVENOUS
Status: DISCONTINUED | OUTPATIENT
Start: 2024-09-29 | End: 2024-09-29 | Stop reason: HOSPADM

## 2024-09-29 RX ORDER — SODIUM CHLORIDE, SODIUM LACTATE, POTASSIUM CHLORIDE, CALCIUM CHLORIDE 600; 310; 30; 20 MG/100ML; MG/100ML; MG/100ML; MG/100ML
INJECTION, SOLUTION INTRAVENOUS CONTINUOUS
Status: DISCONTINUED | OUTPATIENT
Start: 2024-09-29 | End: 2024-09-29 | Stop reason: HOSPADM

## 2024-09-29 RX ORDER — HYDROMORPHONE HYDROCHLORIDE 1 MG/ML
0.2 INJECTION, SOLUTION INTRAMUSCULAR; INTRAVENOUS; SUBCUTANEOUS EVERY 5 MIN PRN
Status: DISCONTINUED | OUTPATIENT
Start: 2024-09-29 | End: 2024-09-29 | Stop reason: HOSPADM

## 2024-09-29 RX ORDER — HYDROCODONE BITARTRATE AND ACETAMINOPHEN 5; 325 MG/1; MG/1
1 TABLET ORAL ONCE AS NEEDED
Status: DISCONTINUED | OUTPATIENT
Start: 2024-09-29 | End: 2024-09-29 | Stop reason: HOSPADM

## 2024-09-29 RX ORDER — METOCLOPRAMIDE HYDROCHLORIDE 5 MG/ML
INJECTION INTRAMUSCULAR; INTRAVENOUS AS NEEDED
Status: DISCONTINUED | OUTPATIENT
Start: 2024-09-29 | End: 2024-09-29 | Stop reason: SURG

## 2024-09-29 RX ORDER — TRAMADOL HYDROCHLORIDE 50 MG/1
50 TABLET ORAL EVERY 6 HOURS PRN
Qty: 12 TABLET | Refills: 0 | Status: SHIPPED | OUTPATIENT
Start: 2024-09-29

## 2024-09-29 RX ORDER — INSULIN ASPART 100 [IU]/ML
INJECTION, SOLUTION INTRAVENOUS; SUBCUTANEOUS ONCE
Status: DISCONTINUED | OUTPATIENT
Start: 2024-09-29 | End: 2024-09-29

## 2024-09-29 RX ORDER — MAGNESIUM SULFATE HEPTAHYDRATE 40 MG/ML
2 INJECTION, SOLUTION INTRAVENOUS ONCE
Status: COMPLETED | OUTPATIENT
Start: 2024-09-29 | End: 2024-09-29

## 2024-09-29 RX ORDER — NICOTINE POLACRILEX 4 MG
15 LOZENGE BUCCAL
Status: DISCONTINUED | OUTPATIENT
Start: 2024-09-29 | End: 2024-09-29 | Stop reason: HOSPADM

## 2024-09-29 RX ORDER — ACETAMINOPHEN 500 MG
1000 TABLET ORAL ONCE AS NEEDED
Status: DISCONTINUED | OUTPATIENT
Start: 2024-09-29 | End: 2024-09-29 | Stop reason: HOSPADM

## 2024-09-29 RX ORDER — HYDROMORPHONE HYDROCHLORIDE 1 MG/ML
0.4 INJECTION, SOLUTION INTRAMUSCULAR; INTRAVENOUS; SUBCUTANEOUS EVERY 5 MIN PRN
Status: DISCONTINUED | OUTPATIENT
Start: 2024-09-29 | End: 2024-09-29 | Stop reason: HOSPADM

## 2024-09-29 RX ORDER — NICOTINE POLACRILEX 4 MG
30 LOZENGE BUCCAL
Status: DISCONTINUED | OUTPATIENT
Start: 2024-09-29 | End: 2024-09-29 | Stop reason: HOSPADM

## 2024-09-29 RX ADMIN — LIDOCAINE HYDROCHLORIDE 100 MG: 10 INJECTION, SOLUTION EPIDURAL; INFILTRATION; INTRACAUDAL; PERINEURAL at 14:14:00

## 2024-09-29 RX ADMIN — METOCLOPRAMIDE HYDROCHLORIDE 10 MG: 5 INJECTION INTRAMUSCULAR; INTRAVENOUS at 14:57:00

## 2024-09-29 RX ADMIN — SODIUM CHLORIDE: 9 INJECTION, SOLUTION INTRAVENOUS at 14:14:00

## 2024-09-29 RX ADMIN — SCOLOPAMINE TRANSDERMAL SYSTEM 1 PATCH: 1 PATCH, EXTENDED RELEASE TRANSDERMAL at 14:03:00

## 2024-09-29 RX ADMIN — ONDANSETRON 4 MG: 2 INJECTION INTRAMUSCULAR; INTRAVENOUS at 14:57:00

## 2024-09-29 NOTE — ANESTHESIA PROCEDURE NOTES
Airway  Date/Time: 9/29/2024 2:15 PM  Urgency: elective    Airway not difficult    General Information and Staff    Patient location during procedure: OR  Anesthesiologist: Dimple Sarabia MD  Performed: anesthesiologist   Performed by: Dimple Sarabia MD  Authorized by: Dimple Sarabia MD      Indications and Patient Condition  Indications for airway management: anesthesia  Spontaneous ventilation: present  Sedation level: deep  Preoxygenated: yes  Patient position: sniffing  Mask difficulty assessment: 1 - vent by mask    Final Airway Details  Final airway type: supraglottic airway      Successful airway: classic  Size 3       Number of attempts at approach: 1

## 2024-09-29 NOTE — PROGRESS NOTES
ALert & oriented x4.With tolerable pain.Kept NPO.Pre-op teachings given.Up in room Plan  of care discussed with patient.All questions and concerns addressed.

## 2024-09-29 NOTE — PROGRESS NOTES
[facility] Hospitalist Progress Note     CC: Hospital Follow up    PCP: Lesa Lakhani MD       Assessment/Plan:     59 year old female with a history of triple negative breast cancer on anastrozole, non-insulin-dependent type 2 diabetes, hypertension, hyperlipidemia, hypothyroidism who is presenting to the hospital with right flank pain and found to have 5 mm right ureteral calculus with hydronephrosis.  Admitted for further management with urology on consult.     5 mm proximal right ureteral calculus s/p laser lithotripsy and right stent placement (9/29)  Mild right hydronephrosis  -Patient endorses pain this morning  -Scheduled Tylenol, as needed morphine  -As needed Zofran  -Flomax  -Underwent cystoscopy, right retrograde pyelogram, laser lithotripsy, stone extraction, right ureteral stent placement and patient tolerated procedure well.  -Will follow-up with urology in 1-2 weeks for stent removal, will monitor patient tonight for postop recovery    Nonoliguric ANATOLIY  -Creatinine elevated from 1.28 yesterday to 1.52 suspect possibly prerenal, could be postrenal due to hydronephrosis  -Continue IV fluids  -Avoid nephrotoxic agents  -Reassess creatinine tomorrow     Non-insulin-dependent type 2 diabetes  Hyperglycemia  -Holding home diabetes medications  -Moderate sliding scale in place  -Will continue to monitor glucose     Breast cancer  -Continue anastrozole 1 mg daily     Hypothyroidism  -Continue levothyroxine 125 mcg     Disposition: Patient underwent procedure with urology today, stent placed.  Will monitor patient overnight and reassess ANATOLIY tomorrow.  Likely discharge tomorrow.     Thank You,  Milvia Mariee DO     Hospitalist with [facility]  Answering Service number: [phone]      Subjective:     Patient seen and examined at bedside this morning.  Currently awaiting to undergo procedure with urology.  Rates the pain as a 6/10, improved with pain medication.    OBJECTIVE:    Blood pressure  143/74, pulse 96, temperature 98.1 °F (36.7 °C), temperature source Temporal, resp. rate 18, height 5' 7\" (1.702 m), weight 230 lb (104.3 kg), last menstrual period 11/01/2020, SpO2 96%, unknown if currently breastfeeding.    Temp:  [98.1 °F (36.7 °C)-98.8 °F (37.1 °C)] 98.1 °F (36.7 °C)  Pulse:  [] 96  Resp:  [12-24] 18  BP: (120-153)/(57-74) 143/74  SpO2:  [91 %-100 %] 96 %      Intake/Output:    Intake/Output Summary (Last 24 hours) at 9/29/2024 1600  Last data filed at 9/29/2024 1526  Gross per 24 hour   Intake 148.33 ml   Output 900 ml   Net -751.67 ml       Last 3 Weights   09/28/24 1839 230 lb (104.3 kg)   09/28/24 1116 230 lb (104.3 kg)   04/17/23 0917 250 lb (113.4 kg)   08/18/21 1812 260 lb (117.9 kg)       Exam   Gen: Mild distress due to pain, alert and oriented x3, no focal neurologic deficits  Heent: NC AT, neck supple  Pulm: Lungs clear, normal respiratory effort  CV: Heart with regular rate and rhythm, no peripheral edema  Abd: Abdomen soft, nontender, nondistended, bowel sounds present  : Right flank tenderness noted  MSK: no clubbing, no cyanosis  Skin: no rashes or lesions  Neuro: AO*3, motor intact, no sensory deficits  Psyc: Agitated due to pain      Data Review:       Labs:     Recent Labs   Lab 09/28/24  1136 09/29/24  0606   RBC 3.72* 3.24*   HGB 13.2 11.5*   HCT 36.8 32.1*   MCV 98.9 99.1   MCH 35.5* 35.5*   MCHC 35.9 35.8   RDW 12.5 12.9   NEPRELIM 6.97  --    WBC 8.3 7.9   .0 144.0*         Recent Labs   Lab 09/28/24  1136 09/29/24  0606   * 198*   BUN 26* 25*   CREATSERUM 1.28* 1.52*   EGFRCR 49* 40*   CA 10.5* 9.5   * 138   K 4.1 3.7    105   CO2 20.0* 23.0       Recent Labs   Lab 09/28/24  1136   ALT 14   AST 14   ALB 4.5         Imaging:  XR FLUOROSCOPY C-ARM TIME LESS THAN 1 HOUR (CPT=76000)    Result Date: 9/29/2024  CONCLUSION:  Please see the procedure/operative report for further details.    LOCATION:  [facility]    Dictated by (CST): Stromberg,  MD Roxanna on 9/29/2024 at 3:42 PM     Finalized by (CST): Stromberg, LeRoy, MD on 9/29/2024 at 3:43 PM       CTA CHEST + CT ABD (W) + CT PEL (W) (CPT=71275/69810)    Result Date: 9/28/2024  CONCLUSION:  1. 5 mm proximal right ureteral calculus with mild right hydronephrosis. 2. Patchy alveolar opacities in right upper lobe and superior segment of right lower lobe the appearance is most consistent with infection or inflammation, differential considerations include pneumonia and radiation change. 3. Hepatic steatosis. 4. Small hiatal hernia. 5. Moderate colonic stool. 6. Postsurgical changes in right breast and right axilla   LOCATION:  [facility]   Dictated by (CST): Thien Taveras MD on 9/28/2024 at 1:22 PM     Finalized by (CST): Thien Taveras MD on 9/28/2024 at 1:34 PM          Meds:      [Transfer Hold] morphINE  4 mg Intravenous Once    [Transfer Hold] insulin aspart  5 Units Subcutaneous Once    [Transfer Hold] heparin  5,000 Units Subcutaneous Q8H BOB    [Transfer Hold] anastrozole  1 mg Oral Daily    [Transfer Hold] levothyroxine  125 mcg Oral Before breakfast    [Transfer Hold] acetaminophen  1,000 mg Oral Q8H    [Transfer Hold] insulin aspart  2-10 Units Subcutaneous TID AC and HS    [Transfer Hold] tamsulosin  0.4 mg Oral Daily @ 0700      lactated ringers 100 mL/hr at 09/29/24 1503       [Transfer Hold] ondansetron **OR** [Transfer Hold] ondansetron    glucose **OR** glucose **OR** glucose-vitamin C **OR** dextrose **OR** glucose **OR** glucose **OR** glucose-vitamin C    lactated ringers    atropine    naloxone    fentaNYL **OR** fentaNYL    HYDROmorphone **OR** HYDROmorphone **OR** HYDROmorphone    acetaminophen **OR** HYDROcodone-acetaminophen **OR** HYDROcodone-acetaminophen    ondansetron    prochlorperazine    lidocaine    iohexol    [Transfer Hold] glucose **OR** [Transfer Hold] glucose **OR** [Transfer Hold] dextrose **OR** [Transfer Hold] glucose **OR** [Transfer Hold] glucose    [Transfer Hold]  ondansetron    [Transfer Hold] morphINE **OR** [Transfer Hold] morphINE    [Transfer Hold] ketorolac

## 2024-09-29 NOTE — ANESTHESIA PREPROCEDURE EVALUATION
PRE-OP EVALUATION    Patient Name: Jennifer Whitney    Admit Diagnosis: Calculus of ureter [N20.1]  Hyperglycemia [R73.9]  Right flank pain [R10.9]    Pre-op Diagnosis: Right ureteral stone [N20.1]    CYSTOSCOPY, RIGHT RETROGRADE PYELOGRAM, URETEROSCOPY, LASER LITHOTRIPSY, STONE EXTRACTION, RIGHT URETERAL STENT PLACEMENT    Anesthesia Procedure: CYSTOSCOPY, RIGHT RETROGRADE PYELOGRAM, URETEROSCOPY, LASER LITHOTRIPSY, STONE EXTRACTION, RIGHT URETERAL STENT PLACEMENT (Right: Ureter)    Surgeons and Role:     * Vikas Rocha MD - Primary    Pre-op vitals reviewed.  Temp: 98.1 °F (36.7 °C)  Pulse: 96  Resp: 18  BP: 143/74  SpO2: 96 %  Body mass index is 36.02 kg/m².    Current medications reviewed.  Hospital Medications:   [COMPLETED] magnesium sulfate in sterile water for injection 2 g/50mL IVPB premix 2 g  2 g Intravenous Once    [Transfer Hold] ondansetron (Zofran-ODT) disintegrating tab 4 mg  4 mg Oral Q6H PRN    Or    [Transfer Hold] ondansetron (Zofran) 4 MG/2ML injection 4 mg  4 mg Intravenous Q6H PRN    glucose (Dex4) 15 GM/59ML oral liquid 15 g  15 g Oral Q15 Min PRN    Or    glucose (Glutose) 40% oral gel 15 g  15 g Oral Q15 Min PRN    Or    glucose-vitamin C (Dex-4) chewable tab 4 tablet  4 tablet Oral Q15 Min PRN    Or    dextrose 50% injection 50 mL  50 mL Intravenous Q15 Min PRN    Or    glucose (Dex4) 15 GM/59ML oral liquid 30 g  30 g Oral Q15 Min PRN    Or    glucose (Glutose) 40% oral gel 30 g  30 g Oral Q15 Min PRN    Or    glucose-vitamin C (Dex-4) chewable tab 8 tablet  8 tablet Oral Q15 Min PRN    lactated ringers infusion   Intravenous Continuous    lactated ringers IV bolus 500 mL  500 mL Intravenous Once PRN    atropine 0.1 MG/ML injection 0.5 mg  0.5 mg Intravenous PRN    naloxone (Narcan) 0.4 MG/ML injection 0.08 mg  0.08 mg Intravenous PRN    fentaNYL (Sublimaze) 50 mcg/mL injection 25 mcg  25 mcg Intravenous Q5 Min PRN    Or    fentaNYL (Sublimaze) 50 mcg/mL injection 50 mcg  50 mcg Intravenous  Q5 Min PRN    HYDROmorphone (Dilaudid) 1 MG/ML injection 0.2 mg  0.2 mg Intravenous Q5 Min PRN    Or    HYDROmorphone (Dilaudid) 1 MG/ML injection 0.4 mg  0.4 mg Intravenous Q5 Min PRN    Or    HYDROmorphone (Dilaudid) 1 MG/ML injection 0.6 mg  0.6 mg Intravenous Q5 Min PRN    acetaminophen (Tylenol Extra Strength) tab 1,000 mg  1,000 mg Oral Once PRN    Or    HYDROcodone-acetaminophen (Norco) 5-325 MG per tab 1 tablet  1 tablet Oral Once PRN    Or    HYDROcodone-acetaminophen (Norco) 5-325 MG per tab 2 tablet  2 tablet Oral Once PRN    ondansetron (Zofran) 4 MG/2ML injection 4 mg  4 mg Intravenous Q6H PRN    prochlorperazine (Compazine) 10 MG/2ML injection 5 mg  5 mg Intravenous Q8H PRN    lidocaine (Urojet) 2 % urethral jelly    PRN    [COMPLETED] morphINE PF 4 MG/ML injection 4 mg  4 mg Intravenous Once    [COMPLETED] ondansetron (Zofran) 4 MG/2ML injection 4 mg  4 mg Intravenous Once    [COMPLETED] sodium chloride 0.9 % IV bolus 1,000 mL  1,000 mL Intravenous Once    [COMPLETED] morphINE PF 4 MG/ML injection 4 mg  4 mg Intravenous Once    [COMPLETED] iopamidol 76% (ISOVUE-370) injection for power injector  100 mL Intravenous ONCE PRN    [COMPLETED] sodium chloride 0.9 % IV bolus 500 mL  500 mL Intravenous Once    [Transfer Hold] glucose (Dex4) 15 GM/59ML oral liquid 15 g  15 g Oral Q15 Min PRN    Or    [Transfer Hold] glucose (Glutose) 40% oral gel 15 g  15 g Oral Q15 Min PRN    Or    [Transfer Hold] dextrose 50% injection 50 mL  50 mL Intravenous Q15 Min PRN    Or    [Transfer Hold] glucose (Dex4) 15 GM/59ML oral liquid 30 g  30 g Oral Q15 Min PRN    Or    [Transfer Hold] glucose (Glutose) 40% oral gel 30 g  30 g Oral Q15 Min PRN    [COMPLETED] morphINE PF 4 MG/ML injection 4 mg  4 mg Intravenous Once    [Transfer Hold] morphINE PF 4 MG/ML injection 4 mg  4 mg Intravenous Once    [COMPLETED] ketorolac (Toradol) 15 MG/ML injection 15 mg  15 mg Intravenous Once    [Transfer Hold] insulin aspart (NovoLOG) 100  Units/mL FlexPen 5 Units  5 Units Subcutaneous Once    [Transfer Hold] heparin (Porcine) 5000 UNIT/ML injection 5,000 Units  5,000 Units Subcutaneous Q8H BOB    [Transfer Hold] anastrozole (Arimidex) tab 1 mg  1 mg Oral Daily    [Transfer Hold] levothyroxine (Synthroid) tab 125 mcg  125 mcg Oral Before breakfast    [] lactated ringers infusion   Intravenous Continuous    [Transfer Hold] ondansetron (Zofran) tab 4 mg  4 mg Oral Q8H PRN    [Transfer Hold] acetaminophen (Tylenol Extra Strength) tab 1,000 mg  1,000 mg Oral Q8H    [Transfer Hold] morphINE PF 4 MG/ML injection 2 mg  2 mg Intravenous Q4H PRN    Or    [Transfer Hold] morphINE PF 4 MG/ML injection 4 mg  4 mg Intravenous Q4H PRN    [Transfer Hold] insulin aspart (NovoLOG) 100 Units/mL FlexPen 2-10 Units  2-10 Units Subcutaneous TID AC and HS    [Transfer Hold] tamsulosin (Flomax) cap 0.4 mg  0.4 mg Oral Daily @ 0700    [COMPLETED] ceFAZolin (Ancef) 2g in 10mL IV syringe premix  2 g Intravenous On Call to OR    [Transfer Hold] ketorolac (Toradol) 15 MG/ML injection 15 mg  15 mg Intravenous Q6H PRN       Outpatient Medications:     Medications Prior to Admission   Medication Sig Dispense Refill Last Dose    anastrozole 1 MG Oral Tab tab Take 1 tablet (1 mg total) by mouth daily.   2024 at 1000    OZEMPIC, 1 MG/DOSE, 4 MG/3ML Subcutaneous Solution Pen-injector Inject 1 mg into the skin once a week.   2024    Empagliflozin 25 MG Oral Tab Take 25 mg by mouth daily.   2024 at 1000    Cyanocobalamin (VITAMIN B 12) 250 MCG Oral Lozenge Take 250 mcg by mouth daily.   2024 at 1000    glimepiride 4 MG Oral Tab Take 1 tablet (4 mg total) by mouth daily.   2024 at 1000    MetFORMIN HCl 1000 MG Oral Tab TAKE ONE TABLET BY MOUTH TWO TIMES A DAY WITH MEALS   Taking at 1000    Vitamin B-12 500 MCG Oral Tab Take 1 tablet (500 mcg total) by mouth daily.   2024 at 1000    Cholecalciferol (VITAMIN D) 125 MCG (5000 UT) Oral Cap Take 1 capsule  (5,000 Units total) by mouth daily.   9/27/2024 at 1000    Levothyroxine Sodium (SYNTHROID, LEVOTHROID) 125 MCG Oral Tab Take 1 tablet (125 mcg total) by mouth before breakfast.   9/27/2024 at 1000    PEG 3350-KCl-Na Bicarb-NaCl 420 g Oral Recon Soln Take 4,000 mL by mouth As Directed. 4000 mL 0     Blood Glucose Monitor Does not apply Kit           Allergies: Amoxicillin and Penicillins      Anesthesia Evaluation    Patient summary reviewed.    Anesthetic Complications  (-) history of anesthetic complications         GI/Hepatic/Renal                                 Cardiovascular      ECG reviewed.          (+) obesity                                       Endo/Other      (+) diabetes                            Pulmonary    Negative pulmonary ROS.                       Neuro/Psych    Negative neuro/psych ROS.                          Breast cancer        Past Surgical History:   Procedure Laterality Date    Biopsy of uterus lining  2009    Neg endometrial biopsy    Cystourethroscopy, with ureteroscopy and/or pyeloscopy; with endoscopic laser Right 09/29/2024    laser litho, stone extraction, stent placement    Other surgical history  2004    varicose vein stripping    Skin surgery  05/08/2015    MMS -R FH     Skin surgery  09/20/2018    Mohs /Left medial cheek /BCC     Social History     Socioeconomic History    Marital status:    Tobacco Use    Smoking status: Never    Smokeless tobacco: Never   Vaping Use    Vaping status: Never Used   Substance and Sexual Activity    Alcohol use: Not Currently     Comment: 1 drink/month    Drug use: No    Sexual activity: Yes     Comment:    Other Topics Concern    Caffeine Concern Yes     History   Drug Use No     Available pre-op labs reviewed.  Lab Results   Component Value Date    WBC 7.9 09/29/2024    RBC 3.24 (L) 09/29/2024    HGB 11.5 (L) 09/29/2024    HCT 32.1 (L) 09/29/2024    MCV 99.1 09/29/2024    MCH 35.5 (H) 09/29/2024    MCHC 35.8 09/29/2024    RDW  12.9 09/29/2024    .0 (L) 09/29/2024     Lab Results   Component Value Date     09/29/2024    K 3.7 09/29/2024     09/29/2024    CO2 23.0 09/29/2024    BUN 25 (H) 09/29/2024    CREATSERUM 1.52 (H) 09/29/2024     (H) 09/29/2024    CA 9.5 09/29/2024            Airway      Mallampati: II  Mouth opening: >3 FB  TM distance: > 6 cm  Neck ROM: full Cardiovascular    Cardiovascular exam normal.  Rhythm: regular  Rate: normal     Dental  Comment: Denies chipped or loose teeth           Pulmonary    Pulmonary exam normal.  Breath sounds clear to auscultation bilaterally.               Other findings              ASA: 2   Plan: general  NPO status verified and patient meets guidelines.    Post-procedure pain management plan discussed with surgeon and patient.    Comment: Risks include but limited to oral and dental injury, nausea/vomiting, anaphylaxis, prolonged ventilation and myocardial infarction. All questions were answered to the patient's satisfaction. Patient expressed understanding and wishes to proceed.   Plan/risks discussed with: patient                Present on Admission:  **None**

## 2024-09-29 NOTE — OPERATIVE REPORT
[facility]   part of [facility]    Operative Note         Jennifer Whitney Location: OR   [facility] [id number] MRN [id number]   Admission Date 9/28/2024 Operation Date 9/29/2024   Attending Physician Milvia Mariee DO       Patient Name: Jennifer Whitney     Preoperative Diagnosis: Right ureteral stone [N20.1]     Postoperative Diagnosis: Right ureteral stone [N20.1]     Procedure(s):  CYSTOSCOPY, RIGHT RETROGRADE PYELOGRAM, URETEROSCOPY, LASER LITHOTRIPSY, STONE EXTRACTION, RIGHT URETERAL STENT PLACEMENT and intraoperative interpretation of fluoroscopic images     Primary Surgeon: Vikas Rocha MD           Anesthesia: General     Specimen:   ID Type Source Tests Collected by Time Destination   1 : Right ureteral stones Calculus Stone (calculus) CALCULI, URINARY, SURGICAL PATHOLOGY TISSUE Vikas Rocha MD 9/29/2024 1445         Estimated Blood Loss: No data recorded     Complications: none      Indications for procedure: intractable pain and nausea from right ureter stone, desires surgery     Surgical Findings: obstructing proximal stone pushed back into the upper pole kidney and lasered. Pieces extracted for stone analysis. Suspect calcium oxalate monohydrate. Ureter stent placed for post op management     Complexity: NA (optional)    Operative Summary:     SPECIMENS: right ureter Stone pieces     DRAINS: A 6-Peruvian x 28 cm double-J ureteral stent.      ESTIMATED BLOOD LOSS: Less than 5 mL.     INDICATION FOR PROCEDURE: Jennifer is a 59 year old year-old female who was diagnosed with right proximal ureter stone.  Desires surgical intervention.  Understands the risks of the procedure, including but not limited to the general anesthetic involved, urinary bleeding, infection, bladder injury, ureteral injury, renal injury, inability to extract the large stone due to its large nature, the need for laser lithotripsy with subsequent passage of particles, the need for ureteral stent with subsequent removal, the risks for ureteral  stricture disease, stone persistence, re-formation, and the unilateral nature of this procedure. Would like to proceed.      PROCEDURE IN DETAIL: The patient was brought to the operating room after being correctly identified and surgical consent was obtained. All questions were answered. General anesthesia was induced without difficulty. Legs were placed in the dorsal lithotomy position, padded and secured, and the working area was prepped and draped in the usual sterile fashion. Examination by  fluoroscopy could not easily see the stone . A 22-Turkmen scope was inserted per urethra. The urethra was normal.  Upon entry into the bladder, the urine was grossly clear. Panendoscopy was performed and showed no evidence of any bladder tumors, diverticula, or stones. The ureteral orifices were normal. The right was cannulated with the open-ended catheter and a retrograde pyelogram was performed showing a normal caliber ureter and a stone at the proximal ureter. I was able to pass the wire up into the kidney, and then 11/13 ureteral access sheath. A flexible ureteroscope was then passed into the proximal ureter. The stone was identified in this region and guided back into the kidney toward the upper pole. It was too large for extraction. A 200 micron holmium laser fiber was fed through. At settings of 0.8 joules and eventually 15 Hz, the stone was fragmented into multiple smaller pieces. I was able to use the stone basket to retrieve out stone fragments for analysis. There was some residual stone dust in the upper pole and smaller and smaller fragments eventually, too small for further extraction.  On retrograde pyelogram, I did not see any residual dominant fragments. At the end of the procedure, retrograde pyelogram was reperformed showing no extravasation and improved flow down the ureter. No residual hydronephrosis. With the Glidewire in good position, a 6-Turkmen x 28 cm double-J ureteral stent was passed. The  proximal pigtail curled in the renal pelvis. The distal pigtail curled in the bladder. A string was not left on the stent.  Lidocaine jelly was instilled per urethra. The patient was returned to the supine position, extubated, and transferred to the recovery room in stable condition. I was present and performed the entire procedure as stated above. There were no complications.  stent will be removed in the next 1-2 weeks.  We will recheck blood work to make sure her ANATOLIY is resolved       Implants:   Implant Name Type Inv. Item Serial No.  Lot No. LRB No. Used Action   STENT URET [id number] ASCERTA - SN/A  STENT URET [id number] ASCERTA N/A [employer] WD [id number] Right 1 Implanted        Drains: no string     Condition: stable       Vikas Rocha MD

## 2024-09-29 NOTE — PLAN OF CARE
Problem: Diabetes/Glucose Control  Goal: Glucose maintained within prescribed range  Description: INTERVENTIONS:  - Monitor Blood Glucose as ordered  - Assess for signs and symptoms of hyperglycemia and hypoglycemia  - Administer ordered medications to maintain glucose within target range  - Assess barriers to adequate nutritional intake and initiate nutrition consult as needed  - Instruct patient on self management of diabetes  Outcome: Progressing     Problem: Patient/Family Goals  Goal: Patient/Family Long Term Goal  Description: Patient's Long Term Goal: go home    Interventions:  - Npo after midnight  -surgery tomorrow  -IV fluids  -Pain management  - See additional Care Plan goals for specific interventions  Outcome: Not Progressing  Goal: Patient/Family Short Term Goal  Description: Patient's Short Term Goal: have tolerable pain    Interventions:   - give pain meds as needed  - See additional Care Plan goals for specific interventions  Outcome: Progressing     Problem: PAIN - ADULT  Goal: Verbalizes/displays adequate comfort level or patient's stated pain goal  Description: INTERVENTIONS:  - Encourage pt to monitor pain and request assistance  - Assess pain using appropriate pain scale  - Administer analgesics based on type and severity of pain and evaluate response  - Implement non-pharmacological measures as appropriate and evaluate response  - Consider cultural and social influences on pain and pain management  - Manage/alleviate anxiety  - Utilize distraction and/or relaxation techniques  - Monitor for opioid side effects  - Notify MD/LIP if interventions unsuccessful or patient reports new pain  - Anticipate increased pain with activity and pre-medicate as appropriate  Outcome: Progressing     Problem: SAFETY ADULT - FALL  Goal: Free from fall injury  Description: INTERVENTIONS:  - Assess pt frequently for physical needs  - Identify cognitive and physical deficits and behaviors that affect risk of  falls.  - [place] fall precautions as indicated by assessment.  - Educate pt/family on patient safety including physical limitations  - Instruct pt to call for assistance with activity based on assessment  - Modify environment to reduce risk of injury  - Provide assistive devices as appropriate  - Consider OT/PT consult to assist with strengthening/mobility  - Encourage toileting schedule  Outcome: Progressing     Problem: GENITOURINARY - ADULT  Goal: Absence of urinary retention  Description: INTERVENTIONS:  - Assess patient’s ability to void and empty bladder  - Monitor intake/output and perform bladder scan as needed  - Follow urinary retention protocol/standard of care  - Consider collaborating with pharmacy to review patient's medication profile  - Implement strategies to promote bladder emptying  Outcome: Progressing

## 2024-09-29 NOTE — PROGRESS NOTES
NURSING ADMISSION NOTE      Patient admitted via Cart from ER  Oriented to room.  Safety precautions initiated.  Bed in low position.  Call light in reach.    Alert & oriented x4.With tolerable pain.Instructed to be NPO after midnight-for surgery tomorrow.Will strain urine.Assessment done.Plan of care discussed with pain . All questions and concerns addressed.

## 2024-09-29 NOTE — DISCHARGE SUMMARY
General Medicine Discharge Summary     Patient ID:  Jennifer Whitney  59 year old  5/1/1965    Admit date: 9/28/2024    Discharge date and time: No discharge date for patient encounter. 9/29/2024    Attending Physician: Milvia Mariee DO     Consults: IP CONSULT TO UROLOGY    Primary Care Physician: Lesa Lakhani MD     Reason for admission: Right kidney stone    Risk For Readmission: Low    Discharge Diagnoses: Calculus of ureter [N20.1]  Hyperglycemia [R73.9]  Right flank pain [R10.9]  See Additional Discharge Diagnoses in Hospital Course    Discharged Condition: good    Follow-up with labs/images appointments: Repeat BMP in 3 days, follow up with PCP in 1 week.    Exam  Gen: No acute distress  Pulm: Lungs clear, normal respiratory effort  CV: Heart with regular rate and rhythm  Abd: Abdomen soft, non tender  : Mild right sided CVA tenderness.   EXT: no edema       Hospital Course: 59 year old female with a history of triple negative breast cancer on anastrozole, non-insulin-dependent type 2 diabetes, hypertension, hyperlipidemia, hypothyroidism who is presenting to the hospital with right flank pain and found to have 5 mm right ureteral calculus with hydronephrosis.  Admitted for further management with urology on consult.Patient underwent cytoscopy with right retrograde pyelogram, laser lithotripsy, stone extraction, right ureteral stent placement on 9/29 and tolerated procedure well. Patient will f/u with urology in 1-2 weeks for stent removal. Patient also had mild bump in creatinine from 1.28 to 1.52 which is suspected to be in the setting of prerenal etiology dehydration vs post renal due to hydronephrosis which has now resolved following procedure with urology. Patient feels symptomatically improved, recommend repeat BMP in 3 days to follow creatinine and follow up with PCP in 1 week.       Operative Procedures: Procedure(s) (LRB):  CYSTOSCOPY, RIGHT RETROGRADE PYELOGRAM, URETEROSCOPY, LASER LITHOTRIPSY,  STONE EXTRACTION, RIGHT URETERAL STENT PLACEMENT (Right)     Imaging: XR FLUOROSCOPY C-ARM TIME LESS THAN 1 HOUR (CPT=76000)    Result Date: 9/29/2024  CONCLUSION:  Please see the procedure/operative report for further details.    LOCATION:  [facility]    Dictated by (CST): Stromberg, LeRoy, MD on 9/29/2024 at 3:42 PM     Finalized by (CST): Stromberg, LeRoy, MD on 9/29/2024 at 3:43 PM       CTA CHEST + CT ABD (W) + CT PEL (W) (CPT=71275/90160)    Result Date: 9/28/2024  CONCLUSION:  1. 5 mm proximal right ureteral calculus with mild right hydronephrosis. 2. Patchy alveolar opacities in right upper lobe and superior segment of right lower lobe the appearance is most consistent with infection or inflammation, differential considerations include pneumonia and radiation change. 3. Hepatic steatosis. 4. Small hiatal hernia. 5. Moderate colonic stool. 6. Postsurgical changes in right breast and right axilla   LOCATION:  [facility]   Dictated by (CST): Thien Taveras MD on 9/28/2024 at 1:22 PM     Finalized by (CST): Thien Taveras MD on 9/28/2024 at 1:34 PM          Disposition: home    Activity: activity as tolerated  Diet: regular diet  Code Status: Full Code    Home Medication Changes:     Med list     Medication List        START taking these medications      traMADol 50 MG Tabs  Commonly known as: Ultram  Take 1 tablet (50 mg total) by mouth every 6 (six) hours as needed for Pain.  Notes to patient: Don't take with alcohol and no driving if taking this medication            CONTINUE taking these medications      anastrozole 1 MG Tabs tab  Commonly known as: Arimidex     Blood Glucose Monitor Kit     * cyanocobalamin 500 MCG Tabs  Commonly known as: Vitamin B12     * Vitamin B 12 250 MCG Lozg     Empagliflozin 25 MG Tabs     glimepiride 4 MG Tabs  Commonly known as: Amaryl     levothyroxine 125 MCG Tabs  Commonly known as: Synthroid     metFORMIN HCl 1000 MG Tabs  Commonly known as: GLUCOPHAGE     Ozempic (1 MG/DOSE) 4 MG/3ML  Sopn  Generic drug: semaglutide  Notes to patient: Take as scheduled     PEG 3350-KCl-Na Bicarb-NaCl 420 g Solr  Commonly known as: NULYTELY  Take 4,000 mL by mouth As Directed.     Vitamin D 125 MCG (5000 UT) Caps           * This list has 2 medication(s) that are the same as other medications prescribed for you. Read the directions carefully, and ask your doctor or other care provider to review them with you.                   Where to Get Your Medications        These medications were sent to [facility] [address] [place] [address] - [address] [facility] [phone], [phone]  [address] [facility] [place] [facility] [address]      Phone: [phone]   traMADol 50 MG Tabs         FU   Follow-up Information       Vikas Rocha MD Follow up in 2 week(s).    Specialty: UROLOGY  Why: office stent removal  Contact information:  [address] [facility]  [address]  [facility] [id number] [phone]                             DC instructions:      Other Discharge Instructions:         Your stone was broken up with a laser.  The stent will help the pieces pass.  Some pieces are being sent for analysis.  Your stone looks like a calcium oxalate stone.  We will include some prevention information for later.  Please keep your post op pain and nausea under control.  Call for fever.  Some blood and bladder urgency is normal.  Please recheck your kidney function next week through our [facility] lab sites.  Hold off on ibuprofen until your kidney is healed up.     Vikas Rocha MD     Calcium Oxalate Stone Prevention Dietary Instructions    Following these suggestions may help prevent future stone formation.    - Take a B-Complex vitamin or 50-100mg vitamin B6 daily.  This helps decrease oxalate    - Take a Magnesium supplement of 300mg daily  Magnesium binds to oxalate in the same way that calcium does but is more soluble.  Magnesium will take the place of calcium when binding with oxalate and be less prone to form stones.    - Avoid  too much vitamin C, try not to exceed 500mg per day    - Increase urinary citrate by squeezing one lemon in your water every day.    - Drink at least twelve 12oz glasses of water every day.    - Decrease your intake of sodium to 2500mg maximum. Try to aim for as low as 1500mg maximum per day    - Decrease phosphorus (soda) and caffeine.    - Limit meat and roasted nuts to 2-3 times per week.    - Females - Eat 3 servings of dairy per day or take a Calcium citrate supplement. Try not to exceed 500mg per day      - Males - Eat 4 servings of dairy per day or take a Calcium citrate supplement. Try not to exceed 750mg per day.      *Please follow up with your Primary Care physician and recheck BMP in few days           I reconciled current and discharge medications on day of discharge, discussed changes with patient and noted changes above.       Total Time Coordinating Care: Greater than 30 minutes    Patient had opportunity to ask questions and state understand and agree with therapeutic plan as outlined    Thank You,    Milvia Mariee, DO   Hospitalist with [facility]

## 2024-09-29 NOTE — PROGRESS NOTES
Received from PACU per bed.Alert & oriented x4.Denies pain.No nausea or vomiting.Ate some goldfish crackers and apple juice.Voided red orange urine,no clots.Dr.ALi patel.Will discharge patient .

## 2024-09-29 NOTE — ANESTHESIA POSTPROCEDURE EVALUATION
[facility]    Jennifer Whitney Patient Status:  Inpatient   Age/Gender 59 year old female MRN [id number]   Location [facility] POST ANESTHESIA CARE UNIT Attending Milvia Mariee DO   Hosp Day # 1 PCP Lesa Lakhani MD       Anesthesia Post-op Note    CYSTOSCOPY, RIGHT RETROGRADE PYELOGRAM, URETEROSCOPY, LASER LITHOTRIPSY, STONE EXTRACTION, RIGHT URETERAL STENT PLACEMENT    Procedure Summary       Date: 09/29/24 Room / Location:  MAIN OR 14 /  MAIN OR    Anesthesia Start: 1408 Anesthesia Stop: 1505    Procedure: CYSTOSCOPY, RIGHT RETROGRADE PYELOGRAM, URETEROSCOPY, LASER LITHOTRIPSY, STONE EXTRACTION, RIGHT URETERAL STENT PLACEMENT (Right: Ureter) Diagnosis:       Right ureteral stone      (Right ureteral stone [N20.1])    Surgeons: Vkias Rocha MD Anesthesiologist: Dimple Sarabia MD    Anesthesia Type: general ASA Status: 2            Anesthesia Type: general    Vitals Value Taken Time   /74 09/29/24 1525   Temp 98.1 °F (36.7 °C) 09/29/24 1525   Pulse 96 09/29/24 1525   Resp 18 09/29/24 1525   SpO2 96 % 09/29/24 1525       Patient Location: PACU    Anesthesia Type: general    Airway Patency: patent and extubated    Postop Pain Control: adequate    Mental Status: mildly sedated but able to meaningfully participate in the post-anesthesia evaluation    Nausea/Vomiting: none    Cardiopulmonary/Hydration status: stable euvolemic    Complications: no apparent anesthesia related complications    Postop vital signs: stable    Dental Exam: Unchanged from Preop    Patient to be discharged from PACU when criteria met.

## 2024-09-29 NOTE — PROGRESS NOTES
NURSING ADMISSION NOTE      Patient admitted via Wheelchair  Oriented to room.  Safety precautions initiated.  Bed in low position.  Call light in reach.    Verbal and written discharge instructions given to patient.Verbalized understanding .To home in stable condition.

## 2024-09-29 NOTE — PLAN OF CARE
Problem: Diabetes/Glucose Control  Goal: Glucose maintained within prescribed range  Description: INTERVENTIONS:  - Monitor Blood Glucose as ordered  - Assess for signs and symptoms of hyperglycemia and hypoglycemia  - Administer ordered medications to maintain glucose within target range  - Assess barriers to adequate nutritional intake and initiate nutrition consult as needed  - Instruct patient on self management of diabetes  9/29/2024 0950 by Chele Mcneil RN  Outcome: Progressing  9/28/2024 2019 by Chele Mcneil RN  Outcome: Progressing     Problem: Patient/Family Goals  Goal: Patient/Family Long Term Goal  Description: Patient's Long Term Goal: go home    Interventions:  -NPO  -Surgery today  -Pain management  - See additional Care Plan goals for specific interventions  9/29/2024 0950 by Chele Mcneil RN  Outcome: Not Progressing  9/28/2024 2019 by Chlee Mcneil RN  Outcome: Not Progressing  Goal: Patient/Family Short Term Goal  Description: Patient's Short Term Goal: have tolerable pain    Interventions:   - give pain meds as needed  - See additional Care Plan goals for specific interventions  9/29/2024 0950 by Chele Mcneil RN  Outcome: Progressing  9/28/2024 2019 by Chele Mcneil RN  Outcome: Progressing     Problem: PAIN - ADULT  Goal: Verbalizes/displays adequate comfort level or patient's stated pain goal  Description: INTERVENTIONS:  - Encourage pt to monitor pain and request assistance  - Assess pain using appropriate pain scale  - Administer analgesics based on type and severity of pain and evaluate response  - Implement non-pharmacological measures as appropriate and evaluate response  - Consider cultural and social influences on pain and pain management  - Manage/alleviate anxiety  - Utilize distraction and/or relaxation techniques  - Monitor for opioid side effects  - Notify MD/LIP if interventions unsuccessful or patient reports new pain  - Anticipate increased pain with activity and  pre-medicate as appropriate  9/29/2024 0950 by Chele Mcneil, RN  Outcome: Progressing  9/28/2024 2019 by Chele Mcneil, RN  Outcome: Progressing     Problem: SAFETY ADULT - FALL  Goal: Free from fall injury  Description: INTERVENTIONS:  - Assess pt frequently for physical needs  - Identify cognitive and physical deficits and behaviors that affect risk of falls.  - [place] fall precautions as indicated by assessment.  - Educate pt/family on patient safety including physical limitations  - Instruct pt to call for assistance with activity based on assessment  - Modify environment to reduce risk of injury  - Provide assistive devices as appropriate  - Consider OT/PT consult to assist with strengthening/mobility  - Encourage toileting schedule  9/29/2024 0950 by Chele Mcneil, RN  Outcome: Progressing  9/28/2024 2019 by Chele Mcneil, RN  Outcome: Progressing

## 2024-09-29 NOTE — CONSULTS
[facility]  Report of Consultation    Jennifer Whitney Patient Status:  Inpatient    1965 MRN [id number]   Location [facility] 3NW-A Attending Ketty Malcolm, DO   Hosp Day # 0 PCP Lesa Lakhani MD     Reason for Consultation:  Right ureter stone, intractable pain    History of Present Illness:  Jennifer Whitney is a a(n) 59 year old female with acute onset of right flank and abd pain with nausea/vomiting today. Dx with proximal right ureter stone. Pain not controlled in ER. Desires admission and surgical treatment. No fever. No hematuria.  Denies stone prior, no rec UTI.  Has had breast cancer and needed radiation surgery and chemo   is present with her  No FH stones  Has DM - current sugars are high    History:  Past Medical History:    Diabetes (HCC)    Diabetes mellitus (HCC)    High cholesterol    Hypertension    Hypothyroidism    Kidney stone    Lipid screening    PER:ng    Lymphedema    Menometrorrhagia    Wears glasses     Past Surgical History:   Procedure Laterality Date    Biopsy of uterus lining      Neg endometrial biopsy    Other surgical history      varicose vein stripping    Skin surgery  5-8-15    [facility]      Skin surgery  2018    Mohs /Left medial cheek /BCC     Family History   Problem Relation Age of Onset    Heart Disorder Father         Congestive heart failure    Heart Disease Father     Diabetes Mother     Gastro-Intestinal Disorder Mother 75        colon polyps    Diabetes Sister     Other (Other) Sister         lymphedema    Heart Disease Paternal Grandmother         CAD    Breast Cancer Paternal Aunt         60s    Breast Cancer Paternal Aunt         60s or older    Glaucoma Neg       reports that she has never smoked. She has never used smokeless tobacco. She reports that she does not currently use alcohol. She reports that she does not use drugs.    Allergies:  Allergies   Allergen Reactions    Amoxicillin RASH     [facility]ner Allergy Text Annotation:  amoxicillin    Penicillins RASH     Other reaction(s): PENICILLINS       Medications:    Current Facility-Administered Medications:     glucose (Dex4) 15 GM/59ML oral liquid 15 g, 15 g, Oral, Q15 Min PRN **OR** glucose (Glutose) 40% oral gel 15 g, 15 g, Oral, Q15 Min PRN **OR** dextrose 50% injection 50 mL, 50 mL, Intravenous, Q15 Min PRN **OR** glucose (Dex4) 15 GM/59ML oral liquid 30 g, 30 g, Oral, Q15 Min PRN **OR** glucose (Glutose) 40% oral gel 30 g, 30 g, Oral, Q15 Min PRN    morphINE PF 4 MG/ML injection 4 mg, 4 mg, Intravenous, Once    insulin aspart (NovoLOG) 100 Units/mL FlexPen 5 Units, 5 Units, Subcutaneous, Once    heparin (Porcine) 5000 UNIT/ML injection 5,000 Units, 5,000 Units, Subcutaneous, Q8H BOB    anastrozole (Arimidex) tab 1 mg, 1 mg, Oral, Daily    [START ON 9/29/2024] levothyroxine (Synthroid) tab 125 mcg, 125 mcg, Oral, Before breakfast    lactated ringers infusion, , Intravenous, Continuous    ondansetron (Zofran) tab 4 mg, 4 mg, Oral, Q8H PRN    acetaminophen (Tylenol Extra Strength) tab 1,000 mg, 1,000 mg, Oral, Q8H    morphINE PF 4 MG/ML injection 2 mg, 2 mg, Intravenous, Q4H PRN **OR** morphINE PF 4 MG/ML injection 4 mg, 4 mg, Intravenous, Q4H PRN    insulin aspart (NovoLOG) 100 Units/mL FlexPen 2-10 Units, 2-10 Units, Subcutaneous, TID AC and HS    tamsulosin (Flomax) cap 0.4 mg, 0.4 mg, Oral, Daily @ 0700    Review of Systems:  Pertinent items are noted in HPI.    Physical Exam:  /71 (BP Location: Left arm)   Pulse 88   Temp 98.4 °F (36.9 °C) (Oral)   Resp 20   Ht 5' 7\" (1.702 m)   Wt 230 lb (104.3 kg)   LMP 11/01/2020   SpO2 95%   BMI 36.02 kg/m²    present  A+Ox3  Some nausea  Pain is starting to come back  ABD RLQ tender  BLADDER no pain with voiding    Laboratory Data:  Lab Results   Component Value Date    WBC 8.3 09/28/2024    HGB 13.2 09/28/2024    HCT 36.8 09/28/2024    .0 09/28/2024    CREATSERUM 1.28 09/28/2024    BUN 26 09/28/2024      09/28/2024    K 4.1 09/28/2024     09/28/2024    CO2 20.0 09/28/2024     09/28/2024    CA 10.5 09/28/2024    PGLU 244 09/28/2024     Lab Results   Component Value Date    CREATSERUM 1.28 (H) 09/28/2024    CREATSERUM 1.37 (H) 09/18/2021    CREATSERUM 1.14 (H) 10/18/2019    CREATSERUM 0.79 04/25/2018    CREATSERUM 0.92 06/30/2015    CREATSERUM 0.81 06/29/2011       Lab Results   Component Value Date     (H) 09/28/2024     (H) 09/18/2021     (H) 10/18/2019     HGBA1C:  8/5/24 6.4    Lab Results   Component Value Date    COLORUR Colorless 09/28/2024    CLARITY Clear 09/28/2024    SPECGRAVITY >1.030 09/28/2024    GLUUR >1000 09/28/2024    BILUR Negative 09/28/2024    KETUR Trace 09/28/2024    BLOODURINE Trace 09/28/2024    PHURINE 5.5 09/28/2024    PROUR Negative 09/28/2024    UROBILINOGEN Normal 09/28/2024    NITRITE Negative 09/28/2024    LEUUR Negative 09/28/2024         Imaging:  CTA CHEST + CT ABD (W) + CT PEL (W) (CPT=71275/31007)    Result Date: 9/28/2024  CONCLUSION:  1. 5 mm proximal right ureteral calculus with mild right hydronephrosis. 2. Patchy alveolar opacities in right upper lobe and superior segment of right lower lobe the appearance is most consistent with infection or inflammation, differential considerations include pneumonia and radiation change. 3. Hepatic steatosis. 4. Small hiatal hernia. 5. Moderate colonic stool. 6. 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    Vitamin B12 deficiency anemia    Type II diabetes mellitus (HCC)    Essential hypertension    Hyperlipidemia    Hypothyroidism    Morbid obesity (HCC)    Noninfectious lymphedema    Neck pain    Myopia    Routine physical examination    Dense  breast    Periumbilical abdominal pain    Maxillary sinusitis    History of 2019 novel coronavirus disease (COVID-19)    Presbyopia    Lipedema    Special screening for malignant neoplasms of colon    Benign neoplasm of ascending colon    Kidney stone    Calculus of ureter    Hyperglycemia    Right flank pain       Right ureter stone   Intractable pain  High blood sugar    Recommendations:  Medical management of her sugars  Pain medication overnight  Plan for OR tomorrow for cystoscopy, right URS, poss laser litho, stone extraction, rpg, and stent placement    Reviewed stent is not a permanent implant  She is worried about nausea with anesthesia    Surgical risks, benefits and alternatives discussed.  Risks of ureteroscopy including but not limited to infection, bleeding, perforation, possible need for stent, inability to reach the stone requiring a staged surgical plan, need for subsequent removal of stent, ureteral spasm, ureteral injury or stricture discussed with patient along with risks of anesthesia which could include death.      Strain urine overnight    Thank you for allowing me to participate in the care of your patient.    Vikas Rocha MD  9/28/2024  7:43 PM

## 2024-09-29 NOTE — DISCHARGE INSTRUCTIONS
Your stone was broken up with a laser.  The stent will help the pieces pass.  Some pieces are being sent for analysis.  Your stone looks like a calcium oxalate stone.  We will include some prevention information for later.  Please keep your post op pain and nausea under control.  Call for fever.  Some blood and bladder urgency is normal.  Please recheck your kidney function next week through our [facility] lab sites.  Hold off on ibuprofen until your kidney is healed up.     Vikas Rocha MD     Calcium Oxalate Stone Prevention Dietary Instructions    Following these suggestions may help prevent future stone formation.    - Take a B-Complex vitamin or 50-100mg vitamin B6 daily.  This helps decrease oxalate    - Take a Magnesium supplement of 300mg daily  Magnesium binds to oxalate in the same way that calcium does but is more soluble.  Magnesium will take the place of calcium when binding with oxalate and be less prone to form stones.    - Avoid too much vitamin C, try not to exceed 500mg per day    - Increase urinary citrate by squeezing one lemon in your water every day.    - Drink at least twelve 12oz glasses of water every day.    - Decrease your intake of sodium to 2500mg maximum. Try to aim for as low as 1500mg maximum per day    - Decrease phosphorus (soda) and caffeine.    - Limit meat and roasted nuts to 2-3 times per week.    - Females - Eat 3 servings of dairy per day or take a Calcium citrate supplement. Try not to exceed 500mg per day      - Males - Eat 4 servings of dairy per day or take a Calcium citrate supplement. Try not to exceed 750mg per day.      *Please follow up with your Primary Care physician and recheck BMP in few days

## 2024-09-29 NOTE — PROGRESS NOTES
2 person skin assessment done with [facility]--Skin intact ,no skin breakdown.Refused insulin coverage ,message sent to  .

## 2024-09-30 LAB
ATRIAL RATE: 86 BPM
P AXIS: 54 DEGREES
P-R INTERVAL: 220 MS
Q-T INTERVAL: 378 MS
QRS DURATION: 90 MS
QTC CALCULATION (BEZET): 452 MS
R AXIS: 2 DEGREES
T AXIS: 27 DEGREES
VENTRICULAR RATE: 86 BPM

## 2024-10-03 NOTE — PAYOR COMM NOTE
--------------  ADMISSION REVIEW   9/28-9/29  Payor: JULIANA ORTIZ  Subscriber #:  [id number]  Authorization Number: [id number]    Admit date: 9/28/24  Admit time:  6:11 PM       REVIEW DOCUMENTATION:     ED Provider Notes        ED Provider Notes signed by Mary Kate Villarreal DO at 9/28/2024  5:42 PM       Author: Mary Kate Villarreal DO Service: -- Author Type: Physician    Filed: 9/28/2024  5:42 PM Date of Service: 9/28/2024  3:06 PM Status: Signed    : Mary Kate Villarreal DO (Physician)           Patient Seen in: [facility] Emergency Department      History     Chief Complaint   Patient presents with    Abdomen/Flank Pain     Stated Complaint: pain to right lower back starting a few hours ago.  pt states heart is skipping    Subjective:   HPI  Patient is a 59-year-old female with a history of hypertension, diabetes, hypothyroidism, hyperlipidemia, breast cancer in remission who presents to the ED for evaluation of severe right flank pain starting a few hours prior to arrival.  Patient states that she woke up and then developed pain shortly after this.  She is never had similar pain before.  Patient reports the pain is constant and sharp.  She does not take any medicine for the pain.  Denies any alleviating factors.  She reports she feels lightheaded and short of breath because of the pain.  She notes nausea and few episodes of nonbloody nonbilious emesis.  Last bowel movement was yesterday and patient reports some constipation.  Denies any history of kidney stones.    Review of Systems    Positive for stated Chief Complaint: Abdomen/Flank Pain    Other systems are as noted in HPI.  Constitutional and vital signs reviewed.      All other systems reviewed and negative except as noted above.    Physical Exam     ED Triage Vitals   BP 09/28/24 1116 147/69   Pulse 09/28/24 1116 73   Resp 09/28/24 1116 20   Temp 09/28/24 1116 97 °F (36.1 °C)   Temp src --    SpO2 09/28/24 1116 100 %   O2 Device 09/28/24 1515 None (Room air)        Current Vitals:   Vital Signs  BP: 159/82  Pulse: 94  Resp: 20  Temp: 97 °F (36.1 °C)  MAP (mmHg): (!) 107    Oxygen Therapy  SpO2: 91 %  O2 Device: None (Room air)        Physical Exam  Vitals and nursing note reviewed.   Constitutional:       Comments: Appears uncomfortable due to pain   HENT:      Head: Normocephalic and atraumatic.      Mouth/Throat:      Mouth: Mucous membranes are moist.   Eyes:      Extraocular Movements: Extraocular movements intact.      Pupils: Pupils are equal, round, and reactive to light.   Cardiovascular:      Rate and Rhythm: Normal rate and regular rhythm.   Pulmonary:      Effort: Pulmonary effort is normal.   Abdominal:      General: There is no distension.      Palpations: Abdomen is soft.      Tenderness: There is no abdominal tenderness. There is no right CVA tenderness or left CVA tenderness.   Musculoskeletal:      Cervical back: Neck supple.      Right lower leg: No edema.      Left lower leg: No edema.   Skin:     General: Skin is warm and dry.      Capillary Refill: Capillary refill takes less than 2 seconds.   Neurological:      General: No focal deficit present.      Mental Status: She is alert.   Psychiatric:         Mood and Affect: Mood normal.     ED Course     Labs Reviewed   COMP METABOLIC PANEL (14) - Abnormal; Notable for the following components:       Result Value    Glucose 326 (*)     Sodium 135 (*)     CO2 20.0 (*)     BUN 26 (*)     Creatinine 1.28 (*)     Calcium, Total 10.5 (*)     Calculated Osmolality 297 (*)     eGFR-Cr 49 (*)     Globulin  3.7 (*)     All other components within normal limits   CBC WITH DIFFERENTIAL WITH PLATELET - Abnormal; Notable for the following components:    RBC 3.72 (*)     MCH 35.5 (*)     Lymphocyte Absolute 0.73 (*)     All other components within normal limits   URINALYSIS, ROUTINE - Abnormal; Notable for the following components:    Urine Color Colorless (*)     Spec Gravity >1.030 (*)     Glucose Urine >1000 (*)      Ketones Urine Trace (*)     Blood Urine Trace (*)     WBC Urine 6-10 (*)     Bacteria Urine Rare (*)     Squamous Epi. Cells Few (*)     All other components within normal limits   POCT GLUCOSE - Abnormal; Notable for the following components:    POC Glucose 311 (*)     All other components within normal limits   POCT GLUCOSE - Abnormal; Notable for the following components:    POC Glucose 244 (*)     All other components within normal limits   LIPASE - Normal   TROPONIN I HIGH SENSITIVITY - Normal       EKG    Rate, intervals and axes as noted on EKG Report.  Rate: 86  Rhythm: Sinus Rhythm  Reading: NSR with ventricular rate of 86, , no acute ST changes      MDM      Patient is a 59-year-old female with a history of hypertension, diabetes, hypothyroidism, hyperlipidemia, breast cancer in remission who presents to the ED for evaluation of severe right flank pain starting a few hours prior to arrival.  Patient is afebrile, HDS, satting well on RA. On exam patient feels uncomfortable due to pain. No reproducible TTP. Bedside US shows no evidence of AAA. Negative FAST. Possibly R sided hydronephrosis on US. Suspect most likely kidney stone with presentation. However given severity of pain associated with lightheadedness. Will obtain CTA chest/abd/pelvis to evaluate for other pathology in addition to labs, UA. Will give IV morphine, zofran, IVF and re-evaluate.       Admission disposition: 9/28/2024  4:57 PM      ED Course as of 09/28/24 1742  ------------------------------------------------------------  Time: 09/28 1242  Comment: Pt c/o severe pain after IV morphine. She does not want IV dilaudid but will give another dose of morphine. Paged CT to take patient over as soon as possible.   ------------------------------------------------------------  Time: 09/28 4709  Comment: CONCLUSION:    1. 5 mm proximal right ureteral calculus with mild right hydronephrosis.   2. Patchy alveolar opacities in right upper lobe  and superior segment of right lower lobe the appearance is most consistent with infection or inflammation, differential considerations include pneumonia and radiation change.   3. Hepatic steatosis.   4. Small hiatal hernia.   5. Moderate colonic stool.   6. Postsurgical changes in right breast and right axilla         ------------------------------------------------------------  Time: 09/28 1438  Comment: Patient reevaluated and is feeling better.  Waiting on UA sample.  I discussed CT findings.  Labs reviewed.  CBC shows no leukocytosis, normal hemoglobin.  CMP shows creatinine of 1.28, which is improved from patient's baseline.  Bicarb 28, likely from vomiting.  Normal blood sugar and anion gap.  Lipase normal.  Troponin negative.  ------------------------------------------------------------  Time: 09/28 8675  Comment: Patient had increased pain again that was severe.  She was given additional dose of IV morphine and 50 mg of Toradol.  Discussed with urology, will keep NPO.  Ordered SSI for hyperglycemia.  Patient's repeat blood sugar after IV fluids was 244.  Patient was admitted to [facility].  Upon reevaluation, patient's pain did improve with medications.  She was updated on plan of care.     Disposition and Plan     Clinical Impression:  1. Calculus of ureter    2. Hyperglycemia    3. Right flank pain       Disposition:  Admit  9/28/2024  4:57 pm      Hospital Problems       Present on Admission  Date Reviewed: 4/24/2023            ICD-10-CM Noted POA    Kidney stone N20.0 9/28/2024 Unknown                     9/28 H&P        Chief Complaint   Patient presents with    Abdomen/Flank Pain      History of Present Illness: Patient is a 59 year old female with a history of triple negative breast cancer on anastrozole, non-insulin-dependent type 2 diabetes, hypertension, hyperlipidemia, hypothyroidism who is presenting to the hospital with right flank pain.  Patient notes that she was at home this morning  when she suddenly started to experience right lower back pain which started to radiate to her right lower abdomen.  She denies having these types of symptoms before.  Notes that she has been able to urinate properly, denies any blood in her urine.  Endorses having nausea and a few episodes of vomiting today as well.  Denies any changes in appetite, fevers, chills, diarrhea, chest pain, shortness of breath.  Given the worsening pain she came to the ER for further evaluation.     On arrival, vital signs were normal.  Labs significant for glucose of 326, bicarb 20, creatinine 1.28 (similar to baseline), no leukocytosis.  CTA chest/abdomen/pelvis showed 5 mm proximal right ureteral calculus with mild right hydronephrosis, alveolar opacities in the right upper lobe and lower lobes consistent with possible inflammation/infection, moderate colonic stool.  Patient received IV fluids, urology consulted.  Admitted for right kidney stone.      59 year old female with a history of triple negative breast cancer on anastrozole, non-insulin-dependent type 2 diabetes, hypertension, hyperlipidemia, hypothyroidism who is presenting to the hospital with right flank pain And found to have 5 mm right ureteral calculus with hydronephrosis.  Admitted for further management with urology on consult.     5 mm proximal right ureteral calculus  Mild right hydronephrosis  -Right flank pain explained by above  -Urology consulted, n.p.o. after midnight for possible procedure tomorrow  -IV fluids  -Scheduled Tylenol, as needed morphine  -As needed Zofran  -Flomax started  -Continue to monitor patient overnight     Non-insulin-dependent type 2 diabetes  Hyperglycemia  -Sugars greater than 300 upon arrival  -Holding home diabetes medications  -5 units lispro given, continue to monitor with moderate sliding scale and make adjustments as necessary     Breast cancer  -Continue anastrozole 1 mg daily     Hypothyroidism  -Continue levothyroxine 125 mcg      Disposition: Patient admitted for right kidney stone, urology consulted.  N.p.o. after midnight.  Anticipate discharge in the next 24-48 hours pending clinical improvement.          9/28 Urology    Reason for Consultation:  Right ureter stone, intractable pain     History of Present Illness:  Jennifer Whitney is a a(n) 59 year old female with acute onset of right flank and abd pain with nausea/vomiting today. Dx with proximal right ureter stone. Pain not controlled in ER. Desires admission and surgical treatment. No fever. No hematuria.  Denies stone prior, no rec UTI.  Has had breast cancer and needed radiation surgery and chemo   is present with her  No FH stones  Has DM - current sugars are high       /71 (BP Location: Left arm)   Pulse 88   Temp 98.4 °F (36.9 °C) (Oral)   Resp 20   Ht 5' 7\" (1.702 m)   Wt 230 lb (104.3 kg)   LMP 11/01/2020   SpO2 95%   BMI 36.02 kg/m²    present  A+Ox3  Some nausea  Pain is starting to come back  ABD RLQ tender  BLADDER no pain with voiding         Right ureter stone   Intractable pain  High blood sugar     Recommendations:  Medical management of her sugars  Pain medication overnight  Plan for OR tomorrow for cystoscopy, right URS, poss laser litho, stone extraction, rpg, and stent placement     Reviewed stent is not a permanent implant  She is worried about nausea with anesthesia     Surgical risks, benefits and alternatives discussed.  Risks of ureteroscopy including but not limited to infection, bleeding, perforation, possible need for stent, inability to reach the stone requiring a staged surgical plan, need for subsequent removal of stent, ureteral spasm, ureteral injury or stricture discussed with patient along with risks of anesthesia which could include death.            9/29 Op Report    Preoperative Diagnosis: Right ureteral stone [N20.1]      Postoperative Diagnosis: Right ureteral stone [N20.1]      Procedure(s):  CYSTOSCOPY, RIGHT  RETROGRADE PYELOGRAM, URETEROSCOPY, LASER LITHOTRIPSY, STONE EXTRACTION, RIGHT URETERAL STENT PLACEMENT and intraoperative interpretation of fluoroscopic images     Complications: none       Indications for procedure: intractable pain and nausea from right ureter stone, desires surgery      Surgical Findings: obstructing proximal stone pushed back into the upper pole kidney and lasered. Pieces extracted for stone analysis. Suspect calcium oxalate monohydrate. Ureter stent placed for post op management      Complexity: NA (optional)     Operative Summary:     SPECIMENS: right ureter Stone pieces     DRAINS: A 6-French x 28 cm double-J ureteral stent.             9/29 IM    59 year old female with a history of triple negative breast cancer on anastrozole, non-insulin-dependent type 2 diabetes, hypertension, hyperlipidemia, hypothyroidism who is presenting to the hospital with right flank pain and found to have 5 mm right ureteral calculus with hydronephrosis.  Admitted for further management with urology on consult.     5 mm proximal right ureteral calculus s/p laser lithotripsy and right stent placement (9/29)  Mild right hydronephrosis  -Patient endorses pain this morning  -Scheduled Tylenol, as needed morphine  -As needed Zofran  -Flomax  -Underwent cystoscopy, right retrograde pyelogram, laser lithotripsy, stone extraction, right ureteral stent placement and patient tolerated procedure well.  -Will follow-up with urology in 1-2 weeks for stent removal, will monitor patient tonight for postop recovery     Nonoliguric ANATOLIY  -Creatinine elevated from 1.28 yesterday to 1.52 suspect possibly prerenal, could be postrenal due to hydronephrosis  -Continue IV fluids  -Avoid nephrotoxic agents  -Reassess creatinine tomorrow     Non-insulin-dependent type 2 diabetes  Hyperglycemia  -Holding home diabetes medications  -Moderate sliding scale in place  -Will continue to monitor glucose     Breast cancer  -Continue anastrozole 1  mg daily     Hypothyroidism  -Continue levothyroxine 125 mcg     Disposition: Patient underwent procedure with urology today, stent placed.  Will monitor patient overnight and reassess ANATOLIY tomorrow.        Patient seen and examined at bedside this morning.  Currently awaiting to undergo procedure with urology.  Rates the pain as a 6/10, improved with pain medication.     OBJECTIVE:     Blood pressure 143/74, pulse 96, temperature 98.1 °F (36.7 °C), temperature source Temporal, resp. rate 18, height 5' 7\" (1.702 m), weight 230 lb (104.3 kg), last menstrual period 11/01/2020, SpO2 96%, unknown if currently breastfeeding.      Gen: Mild distress due to pain, alert and oriented x3, no focal neurologic deficits  Heent: NC AT, neck supple  Pulm: Lungs clear, normal respiratory effort  CV: Heart with regular rate and rhythm, no peripheral edema  Abd: Abdomen soft, nontender, nondistended, bowel sounds present  : Right flank tenderness noted  MSK: no clubbing, no cyanosis  Skin: no rashes or lesions  Neuro: AO*3, motor intact, no sensory deficits  Psyc: Agitated due to pain        Lab 09/28/24  1136 09/29/24  0606   RBC 3.72* 3.24*   HGB 13.2 11.5*   HCT 36.8 32.1*   MCV 98.9 99.1   MCH 35.5* 35.5*   MCHC 35.9 35.8   RDW 12.5 12.9   NEPRELIM 6.97  --    WBC 8.3 7.9   .0 144.0*                 Recent Labs   Lab 09/28/24  1136 09/29/24  0606   * 198*   BUN 26* 25*   CREATSERUM 1.28* 1.52*   EGFRCR 49* 40*   CA 10.5* 9.5   * 138   K 4.1 3.7    105   CO2 20.0* 23.0     Medications 09/27/24 09/28/24 09/29/24   acetaminophen (Tylenol Extra Strength) tab 1,000 mg  Dose: 1,000 mg  Freq: Every 8 hours Route: OR  Start: 09/28/24 1722 End: 09/29/24 2001   Admin Instructions:   Use PRN reason as a guide and follow range order policy     1854 MG-Given        (0122 CC)-Not Given   (0922 MG)-Not Given   1340 UE-MAR Hold     1628 KD-MAR Unhold   (1722 MG)-Not Given   2001-D/C'd          ceFAZolin (Ancef) 2g in  10mL IV syringe premix  Dose: 2 g  Freq: On call to O.R. Route: IV  Start: 09/29/24 0900 End: 09/29/24 1418   Order specific questions:         1415 VB-Given              ketorolac (Toradol) 15 MG/ML injection 15 mg  Dose: 15 mg  Freq: Once Route: IV  Start: 09/28/24 1630 End: 09/28/24 1632     1632 MJ-Given              magnesium sulfate in sterile water for injection 2 g/50mL IVPB premix 2 g  Dose: 2 g  Freq: Once Route: IV  Last Dose: 2 g (09/29/24 0849)  Start: 09/29/24 0845 End: 09/29/24 0949    0849 MG-New Bag                       morphINE PF 4 MG/ML injection 4 mg  Dose: 4 mg  Freq: Once Route: IV  Start: 09/28/24 1626 End: 09/28/24 1628    1628 MJ-Given          morphINE PF 4 MG/ML injection 4 mg  Dose: 4 mg  Freq: Once Route: IV  Start: 09/28/24 1236 End: 09/28/24 1241    1241 TJ-Given          morphINE PF 4 MG/ML injection 4 mg  Dose: 4 mg  Freq: Once Route: IV  Start: 09/28/24 1146 End: 09/28/24 1212    1212 TJ-Given          ondansetron (Zofran) 4 MG/2ML injection 4 mg  Dose: 4 mg  Freq: Once Route: IV  Start: 09/28/24 1147 End: 09/28/24 1217    1217 TJ-Given          sodium chloride 0.9 % IV bolus 1,000 mL  Dose: 1,000 mL  Freq: Once Route: IV  Last Dose: Stopped (09/28/24 1246)  Start: 09/28/24 1147 End: 09/28/24 1246    1156 ET-New Bag   1246 TJ-Stopped         sodium chloride 0.9 % IV bolus 500 mL  Dose: 500 mL  Freq: Once Route: IV  Last Dose: Stopped (09/28/24 1540)  Start: 09/28/24 1424 End: 09/28/24 1540    1429 TJ-New Bag   1540 MJ-Stopped         tamsulosin (Flomax) cap 0.4 mg  Dose: 0.4 mg  Freq: Daily at 0700 Route: OR  Start: 09/28/24 1726 End: 09/29/24 2001    1855 MG-Given        (0700 CC)-Not Given   1340 UE-MAR Hold   1628 KD-MAR Unhold     2001-D/C'd                 lactated ringers infusion  Rate: 100 mL/hr  Freq: Continuous Route: IV  Start: 09/29/24 1515 End: 09/29/24 1627      1503 AV-Restarted   1627-D/C'd       lactated ringers infusion  Rate: 100 mL/hr  Freq: Continuous Route:  IV  Start: 09/28/24 1722 End: 09/29/24 0339     1740 MJ-New Bag   1807 MJ-Handoff       0339-D/C'd            ketorolac (Toradol) 15 MG/ML injection 15 mg  Dose: 15 mg  Freq: Every 6 hours PRN Route: IV  PRN Reason: mild pain  Start: 09/28/24 1951 End: 09/29/24 2001      0245 MG-Given   1340 UE-MAR Hold   1628 KD-MAR Unhold     2001-D/C'd            Or   morphINE PF 4 MG/ML injection 4 mg  Dose: 4 mg  Freq: Every 4 hours PRN Route: IV  PRN Reason: severe pain  Start: 09/28/24 1721 End: 09/29/24 2001   Admin Instructions:   Use PRN reason as a guide and follow range order policy. If oral pain meds are ordered and patient can tolerate oral intake, start with PRN oral pain medications first.    2036 CC-Given        0239 CC-Given   0853 MG-Given   1340 UE-MAR Hold     1628 KD-MAR Unhold   2001-D/C'd                     Or   ondansetron (Zofran) 4 MG/2ML injection 4 mg  Dose: 4 mg  Freq: Every 6 hours PRN Route: IV  PRN Reasons: Nausea,vomiting  Start: 09/29/24 0842 End: 09/29/24 2001    0847 MG-Given   1340 UE-MAR Hold   1628 KD-MAR Unhold     2001-D/C'd        ondansetron (Zofran) tab 4 mg  Dose: 4 mg  Freq: Every 8 hours PRN Route: OR  PRN Reasons: Nausea,vomiting  Start: 09/28/24 1719 End: 09/29/24 2001 2034 CC-Given        0247 CC-Given   1340 UE-MAR Hold   1628 KD-MAR Unhold     2001-D/C'd               Vitals (last day) before discharge       Date/Time Temp Pulse Resp BP SpO2 Weight O2 Device O2 Flow Rate (L/min) [facility]    09/29/24 1637 98.2 °F (36.8 °C) 100 18 133/67 96 % -- None (Room air) -- MG    09/29/24 1525 98.1 °F (36.7 °C) 96 18 143/74 96 % -- None (Room air) -- AV    09/29/24 1518 -- 98 13 129/64 100 % -- None (Room air) -- AV 09/29/24 1513 -- 94 13 120/68 98 % -- None (Room air) -- AV 09/29/24 1508 -- 104 12 128/68 99 % -- None (Room air) -- AV 09/29/24 1503 98.2 °F (36.8 °C) 107 16 128/73 97 % -- None (Room air) -- AV 09/29/24 1241 98.2 °F (36.8 °C) 92 16 137/65 98 % -- None (Room air) --      09/29/24 0830 98.8 °F (37.1 °C) 89 18 125/59 100 % -- None (Room air) -- MG    09/29/24 0538 98.7 °F (37.1 °C) 94 18 127/57 91 % -- None (Room air) -- [place]    09/28/24 2134 98.5 °F (36.9 °C) 86 18 127/65 96 % -- None (Room air) -- [place]    09/28/24 1839 -- -- -- -- -- 230 lb (104.3 kg) -- --     09/28/24 1819 98.4 °F (36.9 °C) 88 20 153/71 95 % -- None (Room air) --     09/28/24 1800 -- 101 22 -- 100 % -- None (Room air) --     09/28/24 1730 -- 94 20 -- 91 % -- None (Room air) --     09/28/24 1634 -- 96 22 -- 100 % -- None (Room air) --     09/28/24 1630 -- 115 24 -- 100 % -- None (Room air) --     09/28/24 1515 -- 79 16 159/82 99 % -- None (Room air) --     09/28/24 1415 -- 87 20 168/88 98 % -- -- --     09/28/24 1245 -- 93 22 -- 100 % -- -- --     09/28/24 1116 97 °F (36.1 °C) 73 20 147/69 100 % 230 lb (104.3 kg) -- -- JR             --------------  DISCHARGE REVIEW    Payor: [facility]  Subscriber #:  [id number]  Authorization Number: [id number]    Admit date: 9/28/24  Admit time:   6:11 PM  Discharge Date: 9/29/2024  5:40 PM     Admitting Physician: Ketty Malcolm DO  Attending Physician:  No att. providers found  Primary Care Physician: Lesa Lakhani MD          Discharge Summary Notes        Discharge Summary signed by Milvia Mariee DO at 9/29/2024  5:37 PM       Author: Milvia Mariee DO Specialty: HOSPITALIST Author Type: Physician    Filed: 9/29/2024  5:37 PM Date of Service: 9/29/2024  5:32 PM Status: Signed    : Milvia Mariee DO (Physician)           General Medicine Discharge Summary     Patient ID:  Jennifer Whitney  59 year old  5/1/1965    Admit date: 9/28/2024    Discharge date and time: No discharge date for patient encounter. 9/29/2024    Attending Physician: Milvia Mariee DO     Consults: IP CONSULT TO UROLOGY    Primary Care Physician: Lesa Lakhani MD     Reason for admission: Right kidney stone    Risk For Readmission: Low    Discharge Diagnoses: Calculus of ureter  [N20.1]  Hyperglycemia [R73.9]  Right flank pain [R10.9]  See Additional Discharge Diagnoses in Hospital Course    Discharged Condition: good    Follow-up with labs/images appointments: Repeat BMP in 3 days, follow up with PCP in 1 week.    Exam  Gen: No acute distress  Pulm: Lungs clear, normal respiratory effort  CV: Heart with regular rate and rhythm  Abd: Abdomen soft, non tender  : Mild right sided CVA tenderness.   EXT: no edema       Hospital Course: 59 year old female with a history of triple negative breast cancer on anastrozole, non-insulin-dependent type 2 diabetes, hypertension, hyperlipidemia, hypothyroidism who is presenting to the hospital with right flank pain and found to have 5 mm right ureteral calculus with hydronephrosis.  Admitted for further management with urology on consult.Patient underwent cytoscopy with right retrograde pyelogram, laser lithotripsy, stone extraction, right ureteral stent placement on 9/29 and tolerated procedure well. Patient will f/u with urology in 1-2 weeks for stent removal. Patient also had mild bump in creatinine from 1.28 to 1.52 which is suspected to be in the setting of prerenal etiology dehydration vs post renal due to hydronephrosis which has now resolved following procedure with urology. Patient feels symptomatically improved, recommend repeat BMP in 3 days to follow creatinine and follow up with PCP in 1 week.       Operative Procedures: Procedure(s) (LRB):  CYSTOSCOPY, RIGHT RETROGRADE PYELOGRAM, URETEROSCOPY, LASER LITHOTRIPSY, STONE EXTRACTION, RIGHT URETERAL STENT PLACEMENT (Right)     Imaging: XR FLUOROSCOPY C-ARM TIME LESS THAN 1 HOUR (CPT=76000)    Result Date: 9/29/2024  CONCLUSION:  Please see the procedure/operative report for further details.    LOCATION:  [place]    Dictated by (CST): Stromberg, LeRoy, MD on 9/29/2024 at 3:42 PM     Finalized by (CST): Stromberg, LeRoy, MD on 9/29/2024 at 3:43 PM       CTA CHEST + CT ABD (W) + CT PEL (W)  (CPT=71275/64270)    Result Date: 9/28/2024  CONCLUSION:  1. 5 mm proximal right ureteral calculus with mild right hydronephrosis. 2. Patchy alveolar opacities in right upper lobe and superior segment of right lower lobe the appearance is most consistent with infection or inflammation, differential considerations include pneumonia and radiation change. 3. Hepatic steatosis. 4. Small hiatal hernia. 5. Moderate colonic stool. 6. Postsurgical changes in right breast and right axilla   LOCATION:  [place]   Dictated by (CST): Thien Taveras MD on 9/28/2024 at 1:22 PM     Finalized by (CST): Thien Taveras MD on 9/28/2024 at 1:34 PM          Disposition: home    Activity: activity as tolerated  Diet: regular diet  Code Status: Full Code    Home Medication Changes:     Med list     Medication List        START taking these medications      traMADol 50 MG Tabs  Commonly known as: Ultram  Take 1 tablet (50 mg total) by mouth every 6 (six) hours as needed for Pain.  Notes to patient: Don't take with alcohol and no driving if taking this medication            CONTINUE taking these medications      anastrozole 1 MG Tabs tab  Commonly known as: Arimidex     Blood Glucose Monitor Kit     * cyanocobalamin 500 MCG Tabs  Commonly known as: Vitamin B12     * Vitamin B 12 250 MCG Lozg     Empagliflozin 25 MG Tabs     glimepiride 4 MG Tabs  Commonly known as: Amaryl     levothyroxine 125 MCG Tabs  Commonly known as: Synthroid     metFORMIN HCl 1000 MG Tabs  Commonly known as: GLUCOPHAGE     Ozempic (1 MG/DOSE) 4 MG/3ML Sopn  Generic drug: semaglutide  Notes to patient: Take as scheduled     PEG 3350-KCl-Na Bicarb-NaCl 420 g Solr  Commonly known as: NULYTELY  Take 4,000 mL by mouth As Directed.     Vitamin D 125 MCG (5000 UT) Caps           * This list has 2 medication(s) that are the same as other medications prescribed for you. Read the directions carefully, and ask your doctor or other care provider to review them with you.                    Where to Get Your Medications        These medications were sent to [facility] [address] [place], [place] - [address] [facility] [phone], [phone]  [address] [facility] [address]      Phone: [phone]   traMADol 50 MG Tabs         FU   Follow-up Information       Vikas Rocha MD Follow up in 2 week(s).    Specialty: UROLOGY  Why: office stent removal  Contact information:  [address]  [address]  [facility] [id number] [phone]                             DC instructions:      Other Discharge Instructions:         Your stone was broken up with a laser.  The stent will help the pieces pass.  Some pieces are being sent for analysis.  Your stone looks like a calcium oxalate stone.  We will include some prevention information for later.  Please keep your post op pain and nausea under control.  Call for fever.  Some blood and bladder urgency is normal.  Please recheck your kidney function next week through our [facility] lab sites.  Hold off on ibuprofen until your kidney is healed up.     Vikas Rocha MD     Calcium Oxalate Stone Prevention Dietary Instructions    Following these suggestions may help prevent future stone formation.    - Take a B-Complex vitamin or 50-100mg vitamin B6 daily.  This helps decrease oxalate    - Take a Magnesium supplement of 300mg daily  Magnesium binds to oxalate in the same way that calcium does but is more soluble.  Magnesium will take the place of calcium when binding with oxalate and be less prone to form stones.    - Avoid too much vitamin C, try not to exceed 500mg per day    - Increase urinary citrate by squeezing one lemon in your water every day.    - Drink at least twelve 12oz glasses of water every day.    - Decrease your intake of sodium to 2500mg maximum. Try to aim for as low as 1500mg maximum per day    - Decrease phosphorus (soda) and caffeine.    - Limit meat and roasted nuts to 2-3 times per week.    - Females - Eat 3 servings  of dairy per day or take a Calcium citrate supplement. Try not to exceed 500mg per day      - Males - Eat 4 servings of dairy per day or take a Calcium citrate supplement. Try not to exceed 750mg per day.      *Please follow up with your Primary Care physician and recheck BMP in few days           I reconciled current and discharge medications on day of discharge, discussed changes with patient and noted changes above.       Total Time Coordinating Care: Greater than 30 minutes    Patient had opportunity to ask questions and state understand and agree with therapeutic plan as outlined    Thank You,    Milvia Mariee DO   Hospitalist with [facility]        Electronically signed by Milvia Mariee DO on 9/29/2024  5:37 PM         REVIEWER COMMENTS

## 2024-10-08 LAB
CAOX MONOHYDRATE: 100 %
WEIGHT-STONE: 8 MG

## (undated) DEVICE — SEAL BIOPSY PORT ACMI BX BLACK CAP

## (undated) DEVICE — SOLUTION IRRIG 3000ML 0.9% NACL FLX CONT

## (undated) DEVICE — CATHETER URET 5FR L70CM FLX OPN TIP NONPORTED

## (undated) DEVICE — 3M™ TEGADERM™ TRANSPARENT FILM DRESSING FRAME STYLE, 1624W, 2-3/8 IN X 2-3/4 IN (6 CM X 7 CM), 100/CT 4CT/CASE: Brand: 3M™ TEGADERM™

## (undated) DEVICE — 20 ML SYRINGE LUER-LOCK TIP: Brand: MONOJECT

## (undated) DEVICE — URETERAL ACCESS SHEATH SET: Brand: NAVIGATOR HD

## (undated) DEVICE — SYRINGE MED 10ML LL TIP W/O SFTY DISP

## (undated) DEVICE — NITINOL STONE RETRIEVAL BASKET: Brand: ZERO TIP

## (undated) DEVICE — SINGLE-USE DIGITAL FLEXIBLE URETEROSCOPE: Brand: LITHOVUE

## (undated) DEVICE — SOLUTION IRRIG 1000ML ST H2O AQUALITE PLAS

## (undated) DEVICE — GLOVE SUR 6.5 SENSICARE PI PIP CRM PWD F

## (undated) DEVICE — SLEEVE COMPR MD KNEE LEN SGL USE KENDALL SCD

## (undated) DEVICE — PACK PBDS CYSTOSCOPY

## (undated) DEVICE — FIBER LSR 200UM 2J 80HZ 60W DL FOR LITHO

## (undated) NOTE — ED AVS SNAPSHOT
Beatris Brian   MRN: [id number]    Department:  [facility] Emergency Department   Date of Visit:  10/18/2019           Disclosure     Insurance plans vary and the physician(s) referred by the ER may not be covered by your plan.  Please contact CARE PHYSICIAN AT ONCE OR RETURN IMMEDIATELY TO THE EMERGENCY DEPARTMENT. If you have been prescribed any medication(s), please fill your prescription right away and begin taking the medication(s) as directed.   If you believe that any of the medications

## (undated) NOTE — LETTER
03/07/18        Edvin Haynes [id number]      Dear Shelley Apodaca,    [address] [facility] records indicate that you have outstanding lab work and or testing that was ordered for you and has not yet been completed:          CBC W Differential W Pl

## (undated) NOTE — LETTER
[address]  [address]  Authorization for Surgical Operation and Procedure     Date:___________                                                                                                         Time:__________  I hereby authorize Surgeon(s):  Vikas Rocha MD, my physician and his/her assistants (if applicable), which may include medical students, residents, and/or fellows, to perform the following surgical operation/ procedure and administer such anesthesia as may be determined necessary by my physician:  Operation/Procedure name (s) Procedure(s):  CYSTOSCOPY, RIGHT RETROGRADE PYELOGRAM, URETEROSCOPY, LASER LITHOTRIPSY, STONE EXTRACTION, RIGHT URETERAL STENT PLACEMENT on Jennifer Whitney   2.   I recognize that during the surgical operation/procedure, unforeseen conditions may necessitate additional or different procedures than those listed above.  I, therefore, further authorize and request that the above-named surgeon, assistants, or designees perform such procedures as are, in their judgment, necessary and desirable.    3.   My surgeon/physician has discussed prior to my surgery the potential benefits, risks and side effects of this procedure; the likelihood of achieving goals; and potential problems that might occur during recuperation.  They also discussed reasonable alternatives to the procedure, including risks, benefits, and side effects related to the alternatives and risks related to not receiving this procedure.  I have had all my questions answered and I acknowledge that no guarantee has been made as to the result that may be obtained.    4.   Should the need arise during my operation/procedure, which includes change of level of care prior to discharge, I also consent to the administration of blood and/or blood products.  Further, I understand that despite careful testing and screening of blood or blood products by collecting agencies, I may still be subject to  ill effects as a result of receiving a blood transfusion and/or blood products.  The following are some, but not all, of the potential risks that can occur: fever and allergic reactions, hemolytic reactions, transmission of diseases such as Hepatitis, AIDS and Cytomegalovirus (CMV) and fluid overload.  In the event that I wish to have an autologous transfusion of my own blood, or a directed donor transfusion, I will discuss this with my physician.  Check only if Refusing Blood or Blood Products  I understand refusal of blood or blood products as deemed necessary by my physician may have serious consequences to my condition to include possible death. I hereby assume responsibility for my refusal and release the hospital, its personnel, and my physicians from any responsibility for the consequences of my refusal.          o  Refuse      5.   I authorize the use of any specimen, organs, tissues, body parts or foreign objects that may be removed from my body during the operation/procedure for diagnosis, research or teaching purposes and their subsequent disposal by hospital authorities.  I also authorize the release of specimen test results and/or written reports to my treating physician on the hospital medical staff or other referring or consulting physicians involved in my care, at the discretion of the Pathologist or my treating physician.    6.   I consent to the photographing or videotaping of the operations or procedures to be performed, including appropriate portions of my body for medical, scientific, or educational purposes, provided my identity is not revealed by the pictures or by descriptive texts accompanying them.  If the procedure has been photographed/videotaped, the surgeon will obtain the original picture, image, videotape or CD.  The hospital will not be responsible for storage, release or maintenance of the picture, image, tape or CD.    7.   I consent to the presence of a  or  observers in the operating room as deemed necessary by my physician or their designees.    8.   I recognize that in the event my procedure results in extended X-Ray/fluoroscopy time, I may develop a skin reaction.    9. If I have a Do Not Attempt Resuscitation (DNAR) order in place, that status will be suspended while in the operating room, procedural suite, and during the recovery period unless otherwise explicitly stated by me (or a person authorized to consent on my behalf). The surgeon or my attending physician will determine when the applicable recovery period ends for purposes of reinstating the DNAR order.  10. Patients having a sterilization procedure: I understand that if the procedure is successful the results will be permanent and it will therefore be impossible for me to inseminate, conceive, or bear children.  I also understand that the procedure is intended to result in sterility, although the result has not been guaranteed.   11. I acknowledge that my physician has explained sedation/analgesia administration to me including the risk and benefits I consent to the administration of sedation/analgesia as may be necessary or desirable in the judgment of my physician.    I CERTIFY THAT I HAVE READ AND FULLY UNDERSTAND THE ABOVE CONSENT TO OPERATION and/or OTHER PROCEDURE.    _________________________________________  __________________________________  Signature of Patient     Signature of Responsible Person         ___________________________________         Printed Name of Responsible Person           _________________________________                 Relationship to Patient  _________________________________________  ______________________________  Signature of Witness          Date  Time      Patient Name: Jennifer Whitney     : 1965                 Printed: 2024     Medical Record #: [id number]                     Page 1 of 2                                    [address]  [place]  [address]    Consent for Anesthesia    IJennifer agree to be cared for by an anesthesiologist, who is specially trained to monitor me and give me medicine to put me to sleep or keep me comfortable during my procedure    I understand that my anesthesiologist is not an employee or agent of [facility] or [employer]. He or she works for [employer].    As the patient asking for anesthesia services, I agree to:  Allow the anesthesiologist (anesthesia doctor) to give me medicine and do additional procedures as necessary. Some examples are: Starting or using an “IV” to give me medicine, fluids or blood during my procedure, and having a breathing tube placed to help me breathe when I’m asleep (intubation). In the event that my heart stops working properly, I understand that my anesthesiologist will make every effort to sustain my life, unless otherwise directed by [facility] Do Not Resuscitate documents.  Tell my anesthesia doctor before my procedure:  If I am pregnant.  The last time that I ate or drank.  All of the medicines I take (including prescriptions, herbal supplements, and pills I can buy without a prescription (including street drugs/illegal medications). Failure to inform my anesthesiologist about these medicines may increase my risk of anesthetic complications.  If I am allergic to anything or have had a reaction to anesthesia before.  I understand how the anesthesia medicine will help me (benefits).  I understand that with any type of anesthesia medicine there are risks:  The most common risks are: nausea, vomiting, sore throat, muscle soreness, damage to my eyes, mouth, or teeth (from breathing tube placement).  Rare risks include: remembering what happened during my procedure, allergic reactions to medications, injury to my airway, heart, lungs, vision, nerves, or muscles and in extremely rare instances death.  My doctor has  explained to me other choices available to me for my care (alternatives).  Pregnant Patients (“epidural”):  I understand that the risks of having an epidural (medicine given into my back to help control pain during labor), include itching, low blood pressure, difficulty urinating, headache or slowing of the baby’s heart. Very rare risks include infection, bleeding, seizure, irregular heart rhythms and nerve injury.  Regional Anesthesia (“spinal”, “epidural”, & “nerve blocks”):  I understand that rare but potential complications include headache, bleeding, infection, seizure, irregular heart rhythms, and nerve injury.    I can change my mind about having anesthesia services at any time before I get the medicine.    _____________________________________________________________________________  Patient (or Representative) Signature/Relationship to Patient  Date   Time    _____________________________________________________________________________   Name (if used)    Language/Organization   Time    _____________________________________________________________________________  Anesthesiologist Signature     Date   Time  I have discussed the procedure and information above with the patient (or patient’s representative) and answered their questions. The patient or their representative has agreed to have anesthesia services.    _____________________________________________________________________________  Witness        Date   Time  I have verified that the signature is that of the patient or patient’s representative, and that it was signed before the procedure  Patient Name: Jennifer Whitney     : 1965                 Printed: 2024     Medical Record #: [id number]                     Page 2 of 2

## (undated) NOTE — LETTER
AUTHORIZATION FOR SURGICAL OPERATION OR OTHER PROCEDURE    1. I hereby authorize Dr. Karine Borges and [facility] staff assigned to my case to perform the following operation and/or procedure at the [facility]:    _______________________________________________________________________________________________    Cortisone Injection  to Right heel  _______________________________________________________________________________________________    2. My physician has explained the nature and purpose of the operation or other procedure, possible alternative methods of treatment, the risks involved, and the possibility of complication to me. I acknowledge that no guarantee has been made as to the result that may be obtained. 3.  I recognize that, during the course of this operation, or other procedure, unforseen conditions may necessitate additional or different procedure than those listed above. I, therefore, further authorize and request that the above named physician, his/her physician assistants or designees perform such procedures as are, in his/her professional opinion, necessary and desirable. 4.  Any tissue or organs removed in the operation or other procedure may be disposed of by and at the discretion of the [facility] and [facility]. 5.  I understand that in the event of a medical emergency, I will be transported by local paramedics to [facility] or other hospital emergency department. 6.  I certify that I have read and fully understand the above consent to operation and/or other procedure. 7.  I acknowledge that my physician has explained sedation/analgesia administration to me including the risks and benefits. I consent to the administration of sedation/analgesia as may be necessary or desirable in the judgement of my physician.     Witness signature: ___________________________________________________ Date:  ______/______/_____ Time:  ________ A. M.  P.M. Patient Name:  ______________________________________________________  (please print)      Patient signature:  ___________________________________________________             Relationship to Patient:           []  Parent    Responsible person                          []  Spouse  In case of minor or                    [] Other  _____________   Incompetent name:  __________________________________________________                               (please print)      _____________      Responsible person  In case of minor or  Incompetent signature:  _______________________________________________    Statement of Physician  My signature below affirms that prior to the time of the procedure, I have explained to the patient and/or his/her guardian, the risks and benefits involved in the proposed treatment and any reasonable alternative to the proposed treatment. I have also explained the risks and benefits involved in the refusal of the proposed treatment and have answered the patient's questions.                         Date:  ______/______/_______  Provider                      Signature:  __________________________________________________________       Time:  ___________ A.M    P.M.

## (undated) NOTE — LETTER
11/18/2020              Keon Lashell        [id number] [facility] [id number]         To whom it may concern,    Amarilys Parekh can return to work on November 30, 2020.       Sincerely,    Geronimo Sender, [address] [place], [place]

## (undated) NOTE — LETTER
11/23/2018              [address] [place] [id number] [facility] [id number]         Dear Beti Garcia,      It was a pleasure to see you at our [address] [facility] office.    Normal pap &  Negative hi